# Patient Record
Sex: FEMALE | Race: BLACK OR AFRICAN AMERICAN
[De-identification: names, ages, dates, MRNs, and addresses within clinical notes are randomized per-mention and may not be internally consistent; named-entity substitution may affect disease eponyms.]

---

## 2018-08-25 ENCOUNTER — HOSPITAL ENCOUNTER (EMERGENCY)
Dept: HOSPITAL 74 - JER | Age: 38
Discharge: HOME | End: 2018-08-25
Payer: COMMERCIAL

## 2018-08-25 VITALS — HEART RATE: 81 BPM | SYSTOLIC BLOOD PRESSURE: 139 MMHG | DIASTOLIC BLOOD PRESSURE: 97 MMHG

## 2018-08-25 VITALS — TEMPERATURE: 98.9 F

## 2018-08-25 VITALS — BODY MASS INDEX: 42.5 KG/M2

## 2018-08-25 DIAGNOSIS — I10: ICD-10-CM

## 2018-08-25 DIAGNOSIS — G43.909: ICD-10-CM

## 2018-08-25 DIAGNOSIS — E11.9: ICD-10-CM

## 2018-08-25 DIAGNOSIS — R51: Primary | ICD-10-CM

## 2018-08-25 DIAGNOSIS — Z86.69: ICD-10-CM

## 2018-08-25 DIAGNOSIS — M06.80: ICD-10-CM

## 2018-08-25 DIAGNOSIS — Z87.39: ICD-10-CM

## 2018-08-25 LAB
ALBUMIN SERPL-MCNC: 3.9 G/DL (ref 3.4–5)
ALP SERPL-CCNC: 67 U/L (ref 45–117)
ALT SERPL-CCNC: 32 U/L (ref 12–78)
ANION GAP SERPL CALC-SCNC: 7 MMOL/L (ref 8–16)
AST SERPL-CCNC: 20 U/L (ref 15–37)
BASOPHILS # BLD: 0.6 % (ref 0–2)
BILIRUB SERPL-MCNC: 0.1 MG/DL (ref 0.2–1)
BUN SERPL-MCNC: 9 MG/DL (ref 7–18)
CALCIUM SERPL-MCNC: 8.8 MG/DL (ref 8.5–10.1)
CHLORIDE SERPL-SCNC: 111 MMOL/L (ref 98–107)
CO2 SERPL-SCNC: 24 MMOL/L (ref 21–32)
CREAT SERPL-MCNC: 0.6 MG/DL (ref 0.55–1.02)
DEPRECATED RDW RBC AUTO: 16.9 % (ref 11.6–15.6)
EOSINOPHIL # BLD: 2.8 % (ref 0–4.5)
GLUCOSE SERPL-MCNC: 111 MG/DL (ref 74–106)
HCT VFR BLD CALC: 33.7 % (ref 32.4–45.2)
HGB BLD-MCNC: 11.3 GM/DL (ref 10.7–15.3)
INR BLD: 1.02 (ref 0.83–1.09)
LYMPHOCYTES # BLD: 44.3 % (ref 8–40)
MCH RBC QN AUTO: 27.7 PG (ref 25.7–33.7)
MCHC RBC AUTO-ENTMCNC: 33.6 G/DL (ref 32–36)
MCV RBC: 82.3 FL (ref 80–96)
MONOCYTES # BLD AUTO: 7.6 % (ref 3.8–10.2)
NEUTROPHILS # BLD: 44.7 % (ref 42.8–82.8)
PLATELET # BLD AUTO: 273 K/MM3 (ref 134–434)
PMV BLD: 8 FL (ref 7.5–11.1)
POTASSIUM SERPLBLD-SCNC: 3.9 MMOL/L (ref 3.5–5.1)
PROT SERPL-MCNC: 7.1 G/DL (ref 6.4–8.2)
PT PNL PPP: 11.5 SEC (ref 9.7–13)
RBC # BLD AUTO: 4.1 M/MM3 (ref 3.6–5.2)
SODIUM SERPL-SCNC: 142 MMOL/L (ref 136–145)
WBC # BLD AUTO: 5 K/MM3 (ref 4–10)

## 2018-08-25 NOTE — EKG
Test Reason : 

Blood Pressure : ***/*** mmHG

Vent. Rate : 092 BPM     Atrial Rate : 092 BPM

   P-R Int : 174 ms          QRS Dur : 084 ms

    QT Int : 394 ms       P-R-T Axes : 062 -09 047 degrees

   QTc Int : 487 ms

 

SINUS RHYTHM WITH PREMATURE ATRIAL COMPLEXES WITH ABERRANT CONDUCTION

SEPTAL INFARCT , AGE UNDETERMINED

ABNORMAL ECG

NO PREVIOUS ECGS AVAILABLE

Confirmed by ESTRADA HUBBARD MD ([id number]) on 8/25/2018 6:43:05 PM

 

Referred By:             Confirmed By:ESTRADA HUBBARD MD

## 2018-08-25 NOTE — PDOC
History of Present Illness





- General


History Source: Patient


Exam Limitations: No Limitations





- History of Present Illness


Initial Comments: 





08/25/18 03:37


The patient is a 38 year old female, with a significant past medical history of 

Lupus, DM, HTN, fibromyalgia, rheumatoid arthritis, migraines, and seizures, 

who presents to the emergency department with, 2 weeks of a headache and 

lightheadedness. As per patient, her headache is described as bandlike with 

associated lightheadedness. She took Advil, without relief. She claims that her 

current symptoms are different from her normal migraines. She reports multiple 

episodes of dizziness like the room is spinning when she gets up too fast 

over the past week. She reports intermittent chest pain she describes as a dull

, constant, 7/10 that radiates to her back with associated palpitations. She 

reports diffuse body aches worsening in her hands and legs which is chronic in 

nature. The patient is currently on her menses. She claims to be compliant with 

all of her medications.





She denies recent fevers or chills. She denies recent nausea, vomit, diarrhea 

or constipation. She denies recent  dysuria, frequency, urgency or hematuria. 

She denies recent shortness of breath.





Allergies: Iodinated Contrast- Oral and IV Dye.


Social history: Nonsmoker. Denies EtOH use and recreational drug use. 


Primary Care Physician: Dr. Reyes











<Cristina Kraus - Last Filed: 08/25/18 03:46>





<Trinidad Aceves - Last Filed: 08/25/18 03:53>





- General


Chief Complaint: Lightheaded


Stated Complaint: PAIN/LIGHTHEADED


Time Seen by Provider: 08/25/18 01:52





Past History





<Cristina Kraus - Last Filed: 08/25/18 03:46>





- Suicide/Smoking/Psychosocial Hx


Smoking History: Never smoked


Have you smoked in the past 12 months: No


Information on smoking cessation initiated: No


Hx Alcohol Use: No


Drug/Substance Use Hx: No





<Trinidad Aceves - Last Filed: 08/25/18 03:53>





- Past Medical History


Allergies/Adverse Reactions: 


 Allergies











Allergy/AdvReac Type Severity Reaction Status Date / Time


 


Iodinated Contrast- Oral and Allergy   Verified 08/25/18 01:31





IV Dye     














**Review of Systems





- Review of Systems


Able to Perform ROS?: Yes


Comments:: 





08/25/18 03:37


GENERAL/CONSTITUTIONAL: No fever or chills. No weakness.


HEAD, EYES, EARS, NOSE AND THROAT: No change in vision. No ear pain or 

discharge. No sore throat.


+CARDIOVASCULAR: Chest pain. No shortness of breath.


RESPIRATORY: No cough, wheezing, or hemoptysis.


GASTROINTESTINAL: No nausea, vomiting, diarrhea or constipation.


GENITOURINARY: No dysuria, frequency, or change in urination.


+MUSCULOSKELETAL: Diffuse body aches. No neck or back pain.


SKIN: No rash


+NEUROLOGIC: Headache. Lightheadedness. No loss of consciousness.


ENDOCRINE: No increased thirst. No abnormal weight change.


HEMATOLOGIC/LYMPHATIC: No anemia, easy bleeding, or history of blood clots.


ALLERGIC/IMMUNOLOGIC: No hives or skin allergy.





All Other Systems: Reviewed and Negative





<Cristina Kraus - Last Filed: 08/25/18 03:46>





*Physical Exam





- Vital Signs


 Last Vital Signs











Temp Pulse Resp BP Pulse Ox


 


 98.9 F   92 H  16   155/93   98 


 


 08/25/18 00:30  08/25/18 00:30  08/25/18 00:30  08/25/18 00:30  08/25/18 00:30














- Physical Exam


Comments: 





08/25/18 03:46


GENERAL: Awake, alert, and fully oriented, in no acute distress


HEAD: No signs of trauma


EYES: PERRLA, EOMI, sclera anicteric, conjunctiva clear


ENT: Auricles normal inspection, hearing grossly normal, nares patent, 

oropharynx clear without exudates. Moist mucosa


NECK: Normal ROM, supple, no lymphadenopathy, JVD, or masses


LUNGS: Breath sounds equal, clear to auscultation bilaterally.  No wheezes, and 

no crackles


HEART: Regular rate and rhythm, normal S1 and S2, no murmurs, rubs or gallops


ABDOMEN: Soft, nontender, normoactive bowel sounds.  No guarding, no rebound.  

No masses


EXTREMITIES: Normal range of motion, no edema.  No clubbing or cyanosis. No 

cords, erythema, or tenderness


+NEUROLOGICAL: 4+/5 strength of the upper and lower right extremities. Normal 

strength of the upper and lower left extremities. Cranial nerves II through XII 

grossly intact.  Normal speech, normal gait


SKIN: Warm, Dry, normal turgor, no rashes or lesions noted.











<Cristina Kraus - Last Filed: 08/25/18 03:46>





- Vital Signs


 Last Vital Signs











Temp Pulse Resp BP Pulse Ox


 


 98.9 F   92 H  16   155/93   98 


 


 08/25/18 00:30  08/25/18 00:30  08/25/18 00:30  08/25/18 00:30  08/25/18 00:30














<Trinidad Aceves - Last Filed: 08/25/18 03:53>





ED Treatment Course





- LABORATORY


CBC & Chemistry Diagram: 


 08/25/18 02:58





 08/25/18 02:58





- ADDITIONAL ORDERS


Additional order review: 


 Laboratory  Results











  08/25/18 08/25/18 08/25/18





  02:58 02:58 02:58


 


PT with INR   


 


INR   


 


PTT (Actin FS)   


 


Sodium    142


 


Potassium    3.9


 


Chloride    111 H


 


Carbon Dioxide    24


 


Anion Gap    7 L


 


BUN    9


 


Creatinine    0.6


 


Creat Clearance w eGFR    > 60


 


Random Glucose    111 H


 


Calcium    8.8


 


Total Bilirubin    0.1 L


 


AST    20


 


ALT    32


 


Alkaline Phosphatase    67


 


Creatine Kinase    174


 


Troponin I    < 0.02


 


Total Protein    7.1


 


Albumin    3.9


 


Serum Pregnancy, Qual  Negative  


 


Carbamazepine   2.6 L* 














  08/25/18 08/25/18





  02:58 02:58


 


PT with INR  11.50 


 


INR  1.02 


 


PTT (Actin FS)   27.0


 


Sodium  


 


Potassium  


 


Chloride  


 


Carbon Dioxide  


 


Anion Gap  


 


BUN  


 


Creatinine  


 


Creat Clearance w eGFR  


 


Random Glucose  


 


Calcium  


 


Total Bilirubin  


 


AST  


 


ALT  


 


Alkaline Phosphatase  


 


Creatine Kinase  


 


Troponin I  


 


Total Protein  


 


Albumin  


 


Serum Pregnancy, Qual  


 


Carbamazepine  








 











  08/25/18





  02:58


 


RBC  4.10


 


MCV  82.3


 


MCHC  33.6


 


RDW  16.9 H


 


MPV  8.0


 


Neutrophils %  44.7


 


Lymphocytes %  44.3 H


 


Monocytes %  7.6


 


Eosinophils %  2.8


 


Basophils %  0.6














- Medications


Given in the ED: 


ED Medications














Discontinued Medications














Generic Name Dose Route Start Last Admin





  Trade Name Freq  PRN Reason Stop Dose Admin


 


Meclizine HCl  25 mg  08/25/18 02:50  08/25/18 03:10





  Antivert -  PO  08/25/18 02:51  25 mg





  ONCE ONE   Administration





     





     





     





     


 


Oxycodone/Acetaminophen  2 combo  08/25/18 02:50  08/25/18 03:10





  Percocet 5/325 -  PO  08/25/18 02:51  2 combo





  ONCE ONE   Administration





     





     





     





     














<Cristina Kraus - Last Filed: 08/25/18 03:46>





- LABORATORY


CBC & Chemistry Diagram: 


 08/25/18 02:58





 08/25/18 02:58





<Trinidad Aceves - Last Filed: 08/25/18 03:53>





Medical Decision Making





- Medical Decision Making





08/25/18 03:46


Pt's tegretol level is low.  Other labs are normal; trop normal; CPK normal.


WBC normal.


Chemistries normal.


C-reactive protein pending.


08/25/18 03:47


Pt's exam is normal, other than her chronic right sided weakness; pt has slight 

weakness on her right side copared to her left side.


No rashes, or fevers.


Chronic joint pains.


Pt has no flank or abd pain.





<Trinidad Aceves - Last Filed: 08/25/18 03:53>





*DC/Admit/Observation/Transfer





- Attestations


Scribe Attestion: 





08/25/18 03:37





Documentation prepared by Cristina Kraus, acting as medical scribe for 

Trinidad Aceves MD.





<Cristina Kraus - Last Filed: 08/25/18 03:46>





<Trinidad Aceves - Last Filed: 08/25/18 03:53>


Diagnosis at time of Disposition: 


 Headache, Uncontrolled hypertension, Seizure secondary to subtherapeutic 

anticonvulsant medication








- Discharge Dispostion


Disposition: HOME


Condition at time of disposition: Stable





- Referrals


Referrals: 


Wan Reyes [Primary Care Provider] - 





- Patient Instructions


Printed Discharge Instructions:  Essential Hypertension, Lifestyle Habits May 

Lower Risk of Hypertension in Women, DI for Headache





- Post Discharge Activity

## 2020-10-12 ENCOUNTER — HOSPITAL ENCOUNTER (INPATIENT)
Dept: HOSPITAL 74 - JER | Age: 40
LOS: 7 days | Discharge: HOME | DRG: 547 | End: 2020-10-19
Attending: INTERNAL MEDICINE | Admitting: GENERAL ACUTE CARE HOSPITAL
Payer: COMMERCIAL

## 2020-10-12 VITALS — BODY MASS INDEX: 39.3 KG/M2

## 2020-10-12 DIAGNOSIS — E78.1: ICD-10-CM

## 2020-10-12 DIAGNOSIS — M79.7: ICD-10-CM

## 2020-10-12 DIAGNOSIS — Z86.718: ICD-10-CM

## 2020-10-12 DIAGNOSIS — R07.81: ICD-10-CM

## 2020-10-12 DIAGNOSIS — R06.02: ICD-10-CM

## 2020-10-12 DIAGNOSIS — J45.909: ICD-10-CM

## 2020-10-12 DIAGNOSIS — I10: ICD-10-CM

## 2020-10-12 DIAGNOSIS — R07.89: ICD-10-CM

## 2020-10-12 DIAGNOSIS — M32.9: Primary | ICD-10-CM

## 2020-10-12 DIAGNOSIS — G40.909: ICD-10-CM

## 2020-10-12 DIAGNOSIS — E05.90: ICD-10-CM

## 2020-10-12 DIAGNOSIS — G43.909: ICD-10-CM

## 2020-10-12 DIAGNOSIS — F32.9: ICD-10-CM

## 2020-10-12 DIAGNOSIS — E66.01: ICD-10-CM

## 2020-10-12 LAB
ALBUMIN SERPL-MCNC: 4.1 G/DL (ref 3.4–5)
ALP SERPL-CCNC: 82 U/L (ref 45–117)
ALT SERPL-CCNC: 50 U/L (ref 13–61)
AMPHET UR-MCNC: NEGATIVE NG/ML
ANION GAP SERPL CALC-SCNC: 4 MMOL/L (ref 8–16)
APPEARANCE UR: CLEAR
AST SERPL-CCNC: 22 U/L (ref 15–37)
BACTERIA # UR AUTO: 103 /UL (ref 0–1359)
BARBITURATES UR-MCNC: NEGATIVE NG/ML
BASOPHILS # BLD: 0.5 % (ref 0–2)
BENZODIAZ UR SCN-MCNC: NEGATIVE NG/ML
BILIRUB SERPL-MCNC: 0.2 MG/DL (ref 0.2–1)
BILIRUB UR STRIP.AUTO-MCNC: NEGATIVE MG/DL
BUN SERPL-MCNC: 9.4 MG/DL (ref 7–18)
CALCIUM SERPL-MCNC: 8.8 MG/DL (ref 8.5–10.1)
CASTS URNS QL MICRO: 1 /UL (ref 0–3.1)
CHLORIDE SERPL-SCNC: 106 MMOL/L (ref 98–107)
CO2 SERPL-SCNC: 28 MMOL/L (ref 21–32)
COCAINE UR-MCNC: NEGATIVE NG/ML
COLOR UR: (no result)
CREAT SERPL-MCNC: 0.6 MG/DL (ref 0.55–1.3)
DEPRECATED RDW RBC AUTO: 13.3 % (ref 11.6–15.6)
EOSINOPHIL # BLD: 2.6 % (ref 0–4.5)
EPITH CASTS URNS QL MICRO: 30 /UL (ref 0–25.1)
GLUCOSE SERPL-MCNC: 103 MG/DL (ref 74–106)
HCT VFR BLD CALC: 38.5 % (ref 32.4–45.2)
HGB BLD-MCNC: 13.4 GM/DL (ref 10.7–15.3)
INR BLD: 1.1 (ref 0.83–1.09)
KETONES UR QL STRIP: NEGATIVE
LEUKOCYTE ESTERASE UR QL STRIP.AUTO: NEGATIVE
LYMPHOCYTES # BLD: 36.9 % (ref 8–40)
MCH RBC QN AUTO: 32.2 PG (ref 25.7–33.7)
MCHC RBC AUTO-ENTMCNC: 34.7 G/DL (ref 32–36)
MCV RBC: 92.6 FL (ref 80–96)
METHADONE UR-MCNC: NEGATIVE NG/ML
MONOCYTES # BLD AUTO: 5.9 % (ref 3.8–10.2)
NEUTROPHILS # BLD: 54.1 % (ref 42.8–82.8)
NITRITE UR QL STRIP: NEGATIVE
OPIATES UR QL SCN: NEGATIVE NG/ML
PCP UR QL SCN: NEGATIVE NG/ML
PH UR: >= 9 [PH] (ref 5–8)
PLATELET # BLD AUTO: 284 K/MM3 (ref 134–434)
PMV BLD: 8 FL (ref 7.5–11.1)
POTASSIUM SERPLBLD-SCNC: 3.9 MMOL/L (ref 3.5–5.1)
PROT SERPL-MCNC: 7.7 G/DL (ref 6.4–8.2)
PROT UR QL STRIP: (no result)
PROT UR QL STRIP: NEGATIVE
PT PNL PPP: 12.9 SEC (ref 9.7–13)
RBC # BLD AUTO: 4.16 M/MM3 (ref 3.6–5.2)
RBC # BLD AUTO: 42 /UL (ref 0–23.9)
SODIUM SERPL-SCNC: 138 MMOL/L (ref 136–145)
SP GR UR: 1.02 (ref 1.01–1.03)
UROBILINOGEN UR STRIP-MCNC: 0.2 MG/DL (ref 0.2–1)
WBC # BLD AUTO: 6.6 K/MM3 (ref 4–10)
WBC # UR AUTO: 69.6 /UL (ref 0–25.8)

## 2020-10-12 PROCEDURE — C8910 MRA W/O CONT, CHEST: HCPCS

## 2020-10-12 PROCEDURE — C9803 HOPD COVID-19 SPEC COLLECT: HCPCS

## 2020-10-12 PROCEDURE — A9540 TC99M MAA: HCPCS

## 2020-10-12 PROCEDURE — A9539 TC99M PENTETATE: HCPCS

## 2020-10-12 PROCEDURE — U0003 INFECTIOUS AGENT DETECTION BY NUCLEIC ACID (DNA OR RNA); SEVERE ACUTE RESPIRATORY SYNDROME CORONAVIRUS 2 (SARS-COV-2) (CORONAVIRUS DISEASE [COVID-19]), AMPLIFIED PROBE TECHNIQUE, MAKING USE OF HIGH THROUGHPUT TECHNOLOGIES AS DESCRIBED BY CMS-2020-01-R: HCPCS

## 2020-10-12 RX ADMIN — ENOXAPARIN SODIUM SCH MG: 100 INJECTION SUBCUTANEOUS at 21:03

## 2020-10-12 NOTE — HP
CHIEF COMPLAINT: SOB








HISTORY OF PRESENT ILLNESS:





40 F h/o SLE, DVT 10 years ago (was on Coumadin), gastric bypass 2019, seizure 

disorder, asthma, HTN, obesity, fibromyalgia, presents with sudden onset SOB and

L sided CP starting 3-4 days ago. Patient endorses suddenly feeling short of 

breath 3 days ago, endorses increased WOB and CP when she coughs or takes a deep

breath. In ED pt. found to be tachycardic and tachypneic saturating 99% on RA. 

Patient endorses IV contrast allergy w/ "shortness of breath", V/Q scheduled but

not done during the day. Denies fever/chills/LOC/travel/sick contacts. No blood 

in stool/mucus/urine. 








ER course was notable for:


(1) EKG w/ TWI Lead III and T wave flattening aVL


(2) Trops neg. x1


(3) CT chest w/o cont. showing dilated pulm. artery, Therapeutic Lovenox 

empirically given for PE 





Recent Travel: Denies





PAST MEDICAL HISTORY: as above





PAST SURGICAL HISTORY: gastric bypass surgery 2019





Social History: denies x3





Allergies





Iodinated Contrast Media Allergy (Verified 10/12/20 15:00)


   


sumatriptan [From Imitrex] Allergy (Verified 10/12/20 15:00)


   Difficulty Breathing








HOME MEDICATIONS:


                                Home Medications











 Medication  Instructions  Recorded


 


Carbamazepine [Tegretol -] 200 mg PO BID 08/25/18


 


Losartan Potassium [Cozaar] 25 mg PO DAILY 08/25/18














PHYSICAL EXAMINATION


                               Vital Signs - 24 hr











  10/12/20 10/12/20 10/12/20





  15:01 15:34 17:00


 


Temperature 98.2 F  


 


Pulse Rate 106 H 88 


 


Pulse Rate [   87





Apical]   


 


Respiratory 22 H  17





Rate   


 


Blood Pressure 116/72  


 


Blood Pressure   107/76





[Right Arm]   


 


O2 Sat by Pulse 100 99 100





Oximetry (%)   














  10/12/20





  17:21


 


Temperature 


 


Pulse Rate 


 


Pulse Rate [ 





Apical] 


 


Respiratory 17





Rate 


 


Blood Pressure 


 


Blood Pressure 





[Right Arm] 


 


O2 Sat by Pulse 100





Oximetry (%) 











GA slightly tachypneic, anxious, answering questions


HEENT NC/AT, no JVD, no stridor, wide neck circumference


CHest no wheezing or crackles, increased WOB, slightly tachypneic


CVS S1, S2+, sinus tachycardia, no m/r/g


Abd obese, Soft, NT, BS+


Ext no LE edema, no calf tenderness





                         Laboratory Results - last 24 hr











  10/12/20 10/12/20 10/12/20





  15:20 15:20 15:20


 


WBC  6.6  


 


RBC  4.16  


 


Hgb  13.4  


 


Hct  38.5  


 


MCV  92.6  


 


MCH  32.2  D  


 


MCHC  34.7  


 


RDW  13.3  D  


 


Plt Count  284  


 


MPV  8.0  


 


Absolute Neuts (auto)  3.6  


 


Neutrophils %  54.1  D  


 


Lymphocytes %  36.9  


 


Monocytes %  5.9  


 


Eosinophils %  2.6  


 


Basophils %  0.5  


 


Nucleated RBC %  0  


 


PT with INR   


 


INR   


 


PTT (Actin FS)   


 


D-Dimer   


 


Sodium    138


 


Potassium    3.9


 


Chloride    106


 


Carbon Dioxide    28


 


Anion Gap    4 L


 


BUN    9.4


 


Creatinine    0.6


 


Est GFR (CKD-EPI)AfAm    134.01


 


Est GFR (CKD-EPI)NonAf    115.63


 


Random Glucose    103


 


Calcium    8.8


 


Total Bilirubin    0.2


 


AST    22


 


ALT    50


 


Alkaline Phosphatase    82


 


Creatine Kinase    88


 


Troponin I    < 0.02


 


Total Protein    7.7


 


Albumin    4.1


 


Urine Color   Dk yellow 


 


Urine Appearance   Clear 


 


Urine pH   >= 9.0 H 


 


Ur Specific Gravity   1.024 


 


Urine Protein   1+ H 


 


Urine Glucose (UA)   Negative 


 


Urine Ketones   Negative 


 


Urine Blood   Negative 


 


Urine Nitrite   Negative 


 


Urine Bilirubin   Negative 


 


Urine Urobilinogen   0.2 


 


Ur Leukocyte Esterase   Negative 


 


Urine WBC (Auto)   69.6 


 


Urine RBC (Auto)   42 


 


Urine Casts (Auto)   1 


 


U Epithel Cells (Auto)   30 


 


Urine Bacteria (Auto)   103 


 


Urine HCG, Qual   Negative 














  10/12/20 10/12/20 10/12/20





  18:30 18:30 19:50


 


WBC   


 


RBC   


 


Hgb   


 


Hct   


 


MCV   


 


MCH   


 


MCHC   


 


RDW   


 


Plt Count   


 


MPV   


 


Absolute Neuts (auto)   


 


Neutrophils %   


 


Lymphocytes %   


 


Monocytes %   


 


Eosinophils %   


 


Basophils %   


 


Nucleated RBC %   


 


PT with INR  12.90  


 


INR  1.10 H  


 


PTT (Actin FS)   30.6 


 


D-Dimer    474


 


Sodium   


 


Potassium   


 


Chloride   


 


Carbon Dioxide   


 


Anion Gap   


 


BUN   


 


Creatinine   


 


Est GFR (CKD-EPI)AfAm   


 


Est GFR (CKD-EPI)NonAf   


 


Random Glucose   


 


Calcium   


 


Total Bilirubin   


 


AST   


 


ALT   


 


Alkaline Phosphatase   


 


Creatine Kinase   


 


Troponin I   


 


Total Protein   


 


Albumin   


 


Urine Color   


 


Urine Appearance   


 


Urine pH   


 


Ur Specific Gravity   


 


Urine Protein   


 


Urine Glucose (UA)   


 


Urine Ketones   


 


Urine Blood   


 


Urine Nitrite   


 


Urine Bilirubin   


 


Urine Urobilinogen   


 


Ur Leukocyte Esterase   


 


Urine WBC (Auto)   


 


Urine RBC (Auto)   


 


Urine Casts (Auto)   


 


U Epithel Cells (Auto)   


 


Urine Bacteria (Auto)   


 


Urine HCG, Qual   





                                Home Medications











 Medication  Instructions  Recorded


 


Carbamazepine [Tegretol -] 200 mg PO BID 08/25/18


 


Losartan Potassium [Cozaar] 25 mg PO DAILY 08/25/18








                               Current Medications











Generic Name Dose Route Start Last Admin





  Trade Name Bharathq  PRN Reason Stop Dose Admin


 


Carbamazepine  200 mg  10/12/20 22:00  10/12/20 22:13





  Tegretol -  PO   200 mg





  BID ADRIANNA   Administration


 


Enoxaparin Sodium  100 mg  10/12/20 20:45  10/12/20 21:03





  Lovenox -  SQ   100 mg





  BID ADRIANNA   Administration














ASSESSMENT/PLAN:





40 F





Highly suspected PE


h/o DVT prev. on Coumadin


HTN


s/p gastric bypass


SLE


Seizure disorder


Fibromyalgia


Depression





Plan:


CT chest w/o cont. neg. for aortic aneurysm/hematoma, dilated PA (?Pulm HTN, PE)


WELLS SCORE 3 which is moderate risk for PE 


Given h/o DVT in the past, Wells score, and h/o SLE (hypercoaguable state) and 

no absolute contraindication to AC, will give Therapeutic Lovenox pending V/Q 

scan.


Pulmonary consult 


Restart seizure meds


Echo


Utox, Thyroid panel (thyromegaly on CT)


Tele monitoring


DVT ppx: Lovenox 














Family Medical History


Family History: As Documented





Visit type





- Emergency Visit


Emergency Visit: Yes


ED Registration Date: 10/12/20


Care time: The patient presented to the Emergency Department on the above date 

and was hospitalized for further evaluation of their emergent condition.





- New Patient


This patient is new to me today: Yes


Date on this admission: 10/12/20





- Critical Care


Critical Care patient: No

## 2020-10-12 NOTE — EKG
Test Reason : 

Blood Pressure : ***/*** mmHG

Vent. Rate : 088 BPM     Atrial Rate : 088 BPM

   P-R Int : 188 ms          QRS Dur : 098 ms

    QT Int : 376 ms       P-R-T Axes : 004 069 000 degrees

   QTc Int : 454 ms

 

NORMAL SINUS RHYTHM

LOW VOLTAGE QRS

SEPTAL INFARCT (CITED ON OR BEFORE 25-AUG-2018)

ABNORMAL ECG

WHEN COMPARED WITH ECG OF 25-AUG-2018 03:06,

NO SIGNIFICANT CHANGE WAS FOUND

Confirmed by CHARMAINE BELTRE, BERKLEY ([id number]) on 10/12/2020 8:17:04 PM

 

Referred By:             Confirmed By:BERKLEY MONTANO MD

## 2020-10-12 NOTE — XMS
Summarization Of Episode

                           Created on:2020



Patient:GIOVANA RANGEL

Sex:Female

:1980

External Reference #:[id number]





Demographics







                          Address                   [address] [place] [address]

 

                          Home Phone                [phone]

 

                          Preferred Language        English

 

                          Marital Status            Not  or 

 

                          Protestant Affiliation     CH

 

                          Race                      BL

 

                          Ethnic Group              Not  or 









Author







                          Organization              [facility]









Support







                Name            Relationship    Address         Phone

 

                UE, UNEMPLOYED  Unavailable     Unavailable     Unavailable

 

                UE              Unavailable     Unavailable     Unavailable

 

                JONO COBIAN MOTHER          [id number] [phone]



                                                [place] [address] 

 

                UNEMPLOYD       Unavailable     Unavailable     Unavailable









Re-disclosure Warning

The records that you are about to access may contain information from federally-
assisted alcohol or drug abuse programs. If such information is present, then 
the following federally mandated warning applies: This information has been 
disclosed to you from records protected by federal confidentiality rules (42 CFR
part 2). The federal rules prohibit you from making any further disclosure of 
this information unless further disclosure is expressly permitted by the written
consent of the person to whom it pertains or as otherwise permitted by 42 CFR 
part 2. A general authorization for the release of medical or other information 
is NOT sufficient for this purpose. The Federal rules restrict any use of the 
information to criminally investigate or prosecute any alcohol or drug abuse 
patient.The records that you are about to access may contain highly sensitive 
health information, the redisclosure of which is protected by Article 27-F of 
the [facility] Public Health law. If you continue you may haveaccess to 
information: Regarding HIV / AIDS; Provided by facilities licensed or operated 
by the [facility] Office of Mental Health; or Provided by the [facility]
Office for People With Developmental Disabilities. If such information is 
present, then the following [place] mandated warning applies: This 
information has been disclosed to you from confidential records which are 
protected by state law. State law prohibits you from making any further 
disclosure of this information without the specific written consent of the 
person to whom it pertains, or as otherwise permitted by law. Any unauthorized 
further disclosure in violation of state law may result in a fine or detention 
sentence or both. A general authorization for the release of medical or other 
information is NOT sufficient authorization for further disclosure.



Insurance Providers







          Payer name Policy type Policy ID Covered   Covered party's Policy    P

sujatha



                    / Coverage           party ID  relationship to Zarate    Inf

ormation



                    type                          zarate              

 

          [facility]           [id number]           [id number]                  [id number]

[id number]



          NON CAP                                                     

 

          [employer]           [id number]           SP                  [id number]

3

 

          [facility]           [id number]           SP                  [id number]

[id number]



          NON CAP                                                     

 

          [employer]           [id number]            PT                  [id number]



          INS

## 2020-10-12 NOTE — XMS
Summarization Of Episode

                           Created on:2020



Patient:GIOVANA RANGEL

Sex:Female

:1980

External Reference #:[id number]





Demographics







                          Address                   [address] [place] [address]

 

                          Home Phone                [phone]

 

                          Preferred Language        English

 

                          Marital Status            Not  or 

 

                          Anglican Affiliation     CH

 

                          Race                      BL

 

                          Ethnic Group              Not  or 









Author







                          Organization              [facility]









Support







                Name            Relationship    Address         Phone

 

                UE              Unavailable     Unavailable     Unavailable

 

                JONO COBIAN          [id number]     [phone]



                                                [place] [address] 

 

                UNEMPLOYD       Unavailable     Unavailable     Unavailable









Re-disclosure Warning




Insurance Providers







          Payer name Policy type Policy ID Covered   Covered party's Policy    P

sujatha



                    / Coverage           party ID  relationship to Zarate    Inf

ormation



                    type                          zarate              

 

          [facility]           [id number]           SP                  [id number]

[id number]



          NON CAP                                                     

 

          [employer]           [id number]           SP                  [id number]

3

 

          [employer]           [id number]            PT                  [id number]



          INS

## 2020-10-12 NOTE — PDOC
Rapid Medical Evaluation


Time Seen by Provider: 10/12/20 15:03


Medical Evaluation: 


                                    Allergies











Allergy/AdvReac Type Severity Reaction Status Date / Time


 


Iodinated Contrast Media Allergy   Verified 10/12/20 15:00


 


sumatriptan [From Imitrex] Allergy  Difficulty Verified 10/12/20 15:00





   Breathing  











10/12/20 15:03


40 year old female hx of lupus and gastric bypass complaining of chest pain and 

SOB.  Better with sitting up 





Vitals significant for  O2 100





PE: CTA 


RRR





Plan:


Labs 


EKG 


Chest XR





Pt to precede to ED for further eval

## 2020-10-12 NOTE — PDOC
Attending Attestation





- Resident


Resident Name: SenaitRadha





- ED Attending Attestation


I have performed the following: I have examined & evaluated the patient, The 

case was reviewed & discussed with the resident, I agree w/resident's findings &

plan, Exceptions are as noted





- HPI


HPI: 





10/12/20 17:15


This 39 yo female  with PMH  of Lupus,DVT,obesity, has had pleuretic chest pain 

that  increases with deep breaths 





- Physicial Exam


PE: 





10/12/20 17:25


obese 39 yo female p/e chest pain


head ncat


neck supple


lungs  cta b/l


cvs  [id number] 


abdomen  no rebound


extremities no deformities 


skin warm and dry


neuro axox3,ambulatory


psych  appropriate





- Medical Decision Making





10/12/20 17:27


plan   labs/anticoagulation/duplex dopplers LE  and admit for VQ. IR was 

consulted to and is aware of this pt. PT is admitted 





-complication to obtain cta chest is the patient's allergy to    IV constrast


10/12/20 20:08








Discharge





- Discharge Information


Problems reviewed: Yes


Clinical Impression/Diagnosis: 


 Shortness of breath





Chest pain


Qualifiers:


 Chest pain type: chest pain on breathing Qualified Code(s): R07.1 - Chest pain 

on breathing








- Follow up/Referral





- Patient Discharge Instructions





- Post Discharge Activity

## 2020-10-12 NOTE — PDOC
History of Present Illness





- General


Chief Complaint: Chest Pain


Stated Complaint: CHEST PAIN


Time Seen by Provider: 10/12/20 15:03





- History of Present Illness


Initial Comments: 





10/12/20 15:57


HPI:


This is a 41 y/o female with a PMH of lupus, HTN, RA, seizures, DVT 10 years ago

presenting to the ED because of 4 days of chest pain. The pain started abruptly 

and she denies any inciting incident. Its constant, sharp, left sided, and 

occasionally radiates to her back. The pain is worse with deep breaths. She is 

also having increased SOB, worse with exertion and is also complaining of 

lightheadedness. She denies any LOC. She has experienced similar pain before, 

but never this bad. She denies any associated N/V, abdominal pain, headache, 

blurry vision, or pain in her calfs. Admits to DVT over 10 years ago, and she 

was anticoagulated with Coumadin. No longer on anticoagulants. Patient reports 

IV contrast allergy. It makes her SOB. 





She denies recent travel, sick contacts, hx of malignancy, cough, or hemoptysis.







ROS:


GENERAL/CONSTITUTIONAL: No fever/chills, diaphoresis, or weakness.


HEENT: No change in vision. No ear pain.  No sore throat.


CARDIOVASCULAR: Admits to sharp left sided chest pain, worse with deep breaths. 

Denies palpitations or peripheral edema. 


RESPIRATORY: Admits to shortness of breath, worse with exertion. No cough, 

wheezing, or hemoptysis.


GASTROINTESTINAL: No abdominal pain, nausea, vomiting, diarrhea or constipation.


GENITOURINARY: No dysuria, frequency


MUSCULOSKELETAL: No joint or muscle swelling or pain. 


SKIN: No rash or hives


NEUROLOGIC: Admits to lightheadedness. Denies vertigo, focal weakness, loss of 

consciousness, or change in strength/sensation.


ENDOCRINE: No increased thirst. No unexplained weight loss.


HEMATOLOGIC/LYMPHATIC: History of DVT 10 years ago. Not on anticoagulation.





PCP: [facility]





PMH: lupus, HTN, RA, seizures, DVT 10 years ago


PSx: Bariatric surgery


Social Hx: Denied etoh, tobacco, drug use


Meds: See nurse note


Allergies: IV contrast





PE:


GENERAL: Awake, alert, and fully oriented, in no acute distress. Patient is non-

toxic, laying in bed able to speak full sentences. Shallow breathing.


HEENT: Normocephalic, atraumatic. PERRLA, EOMI. No conjunctival pallor. Moist 

mucous membranes.


NECK: Normal ROM and supple. No lymphadenopathy, JVD, or masses.


CARDIOVASCULAR: Tachycardic, normal S1 and S2, no murmurs, rubs or gallops


PULMONARY: Tachypneic taking shallow breaths. Breath sounds equal, clear to au

scultation bilaterally.  No wheezes, rales or rhonchi.


ABDOMEN: Soft, nontender, normoactive bowel sounds.


EXTREMITIES: Normal range of motion, no edema or erythema, no calf tenderness.  

No clubbing or cyanosis. 


NEUROLOGICAL: Cranial nerves II through XII grossly intact.  Normal speech.


SKIN: Warm, Dry, normal turgor, no rashes or lesions noted. Normal capillary 

refill.





MDM:


10/12/20 16:46


This is a 41 y/o female with a PMH of lupus, HTN, RA, seizures, DVT 10 years ago

presenting to the ED because of 4 days of chest pain.


- Pain is sharp, constant, pleuritic


- Hemodynamically stable. Saturating 100% on RA. Taking shallow breaths.


- Hx of DVT, lupus. Concern for PE


- Afebrile, no cough. Less concerned for pna


- Patient reports allergy to contrast and says it makes her short of breath





CBC, CMP, Cardiac Panel, Coags


CXR, EKG


V/Q scan because of contrast allergy





EKG:


No ST elevations


T wave inversion in III not on previous EKG from Aug 2018


Sinus rhythm 88bpm


Low voltage QRS


Normal intervals





- Spoke with Dr. Chavez - As long as patient is  hemodynamically stable can be 

admitted for V/Q scan in the morning. DVT study tonight, CXR, and can dose 

heparin if high suspicion for PE.





Wells score of 3, putting her at 16.2% chance of PE in the ED population





10/12/20 16:51


- Pending CXR and coags will admit for V/Q scan tomorrow. 


- Tylenol for pain, fluids





10/12/20 17:54


- Patient currently getting DVT U/S





Labs: CBC WNL


CMP WNL


Troponin negative


UA negative





Decision to anticoagulate her due to 16.2% change of PE and hypercoagulable 

state. No contraindications to anticoagulation.





10/12/20 17:54


- Admitted to [facility] 


10/13/20 09:02





10/13/20 09:03








Past History





- Medical History


Allergies/Adverse Reactions: 


                                    Allergies











Allergy/AdvReac Type Severity Reaction Status Date / Time


 


Iodinated Contrast Media Allergy   Verified 10/12/20 15:00


 


sumatriptan [From Imitrex] Allergy  Difficulty Verified 10/12/20 15:00





   Breathing  











Home Medications: 


Ambulatory Orders





Carbamazepine [Tegretol -] 200 mg PO BID 08/25/18 


Losartan Potassium [Cozaar] 25 mg PO DAILY 08/25/18 








COPD: No


Diabetes: Yes


HTN: Yes


Seizures: Yes


Other medical history: Lupus





- Surgical History


GI Surgery: Yes (bypass)





- Reproductive History


Is Patient Pregnant Now?: No





- Psycho-Social/Smoking History


Smoking History: Never smoked


Have you smoked in the past 12 months: No





- Substance Abuse Hx (Audit-C & DAST Scrn)


How often the patient has a drink containing alcohol: Never


Score: In Men: 4 or > Positive; In Women: 3 or > Positive: 0


Screen Result (Pos requires Nsg. Audit-10AR): Negative


In the last yr the pt used illegal drug/Rx for NonMed reason: No


Score:  Yes response is considered Positive: 0


Screen Result (Positive result requires Nsg. DAST-10): Negative





*Physical Exam





- Vital Signs


                                Last Vital Signs











Temp Pulse Resp BP Pulse Ox


 


 98.2 F   106 H  22 H  116/72   100 


 


 10/12/20 15:01  10/12/20 15:01  10/12/20 15:01  10/12/20 15:01  10/12/20 15:01














**Heart Score/ECG Review





- History


History: Slightly suspicious





- Electrocardiogram


EKG: Non specific repolarization disturbance





- Age


Age: </= 45





- Risk Factors


Risk Factors Heart Score: Yes Hx Hypertension, Yes Hx Obesity


Based on the list above the patient has:: 1-2 risk factors





- Troponin


Troponin: </= normal limit





- Score


Heart Score - Total: 2





ED Treatment Course





- LABORATORY


CBC & Chemistry Diagram: 


                                 10/13/20 06:09





                                 10/13/20 06:09





Discharge





- Discharge Information


Problems reviewed: Yes


Clinical Impression/Diagnosis: 


 Shortness of breath





Chest pain


Qualifiers:


 Chest pain type: chest pain on breathing Qualified Code(s): R07.1 - Chest pain 

on breathing








- Admission


Yes





- Follow up/Referral





- Patient Discharge Instructions





- Post Discharge Activity

## 2020-10-13 LAB
ALBUMIN SERPL-MCNC: 3.3 G/DL (ref 3.4–5)
ALP SERPL-CCNC: 67 U/L (ref 45–117)
ALT SERPL-CCNC: 38 U/L (ref 13–61)
ANION GAP SERPL CALC-SCNC: 5 MMOL/L (ref 8–16)
APTT BLD: 35.7 SECONDS (ref 25.2–36.5)
AST SERPL-CCNC: 14 U/L (ref 15–37)
BASOPHILS # BLD: 0.3 % (ref 0–2)
BILIRUB SERPL-MCNC: 0.2 MG/DL (ref 0.2–1)
BUN SERPL-MCNC: 8.2 MG/DL (ref 7–18)
CALCIUM SERPL-MCNC: 8.1 MG/DL (ref 8.5–10.1)
CHLORIDE SERPL-SCNC: 110 MMOL/L (ref 98–107)
CHOLEST SERPL-MCNC: 169 MG/DL (ref 50–200)
CO2 SERPL-SCNC: 24 MMOL/L (ref 21–32)
CREAT SERPL-MCNC: 0.5 MG/DL (ref 0.55–1.3)
DEPRECATED RDW RBC AUTO: 13.6 % (ref 11.6–15.6)
EOSINOPHIL # BLD: 3.4 % (ref 0–4.5)
GLUCOSE SERPL-MCNC: 89 MG/DL (ref 74–106)
HCT VFR BLD CALC: 33.9 % (ref 32.4–45.2)
HDLC SERPL-MCNC: 68 MG/DL (ref 40–60)
HGB BLD-MCNC: 12 GM/DL (ref 10.7–15.3)
INR BLD: 1.1 (ref 0.83–1.09)
LDLC SERPL CALC-MCNC: 81 MG/DL (ref 5–100)
LYMPHOCYTES # BLD: 46.8 % (ref 8–40)
MCH RBC QN AUTO: 32.8 PG (ref 25.7–33.7)
MCHC RBC AUTO-ENTMCNC: 35.4 G/DL (ref 32–36)
MCV RBC: 92.7 FL (ref 80–96)
MONOCYTES # BLD AUTO: 6.8 % (ref 3.8–10.2)
NEUTROPHILS # BLD: 42.7 % (ref 42.8–82.8)
PLATELET # BLD AUTO: 240 K/MM3 (ref 134–434)
PMV BLD: 8.2 FL (ref 7.5–11.1)
POTASSIUM SERPLBLD-SCNC: 3.6 MMOL/L (ref 3.5–5.1)
PROT SERPL-MCNC: 6.2 G/DL (ref 6.4–8.2)
PT PNL PPP: 12.9 SEC (ref 9.7–13)
RBC # BLD AUTO: 3.66 M/MM3 (ref 3.6–5.2)
SODIUM SERPL-SCNC: 140 MMOL/L (ref 136–145)
TRIGL SERPL-MCNC: 227 MG/DL (ref 0–150)
WBC # BLD AUTO: 5.5 K/MM3 (ref 4–10)

## 2020-10-13 RX ADMIN — ENOXAPARIN SODIUM SCH MG: 100 INJECTION SUBCUTANEOUS at 21:37

## 2020-10-13 RX ADMIN — ENOXAPARIN SODIUM SCH MG: 100 INJECTION SUBCUTANEOUS at 10:33

## 2020-10-13 NOTE — PN
Progress Note (short form)





- Note


Progress Note: 





PULMONARY








CONSULTATION DICTATED 10/13/20





IMP CP/DYSPNEA  DOUBT PE,?LUPUS PLEURITIS


      H/O SLE


      H/O DVT UNPROVOKED NEVER WORKED UP FOR HYPER-COAGULABLE STATE


      ASTHMA


      SEIZURE DISORDER


      HTN


      H/O GASTRIC BYPASS


      OBESITY





PLAN O2 AS NEEDED


       ANALGESICS


       INHALED BRONCHODILATORS AS NEEDED


       ESR,CRP


       ECHO


       DUPLEX LOWER EXT


       V/Q








DR RDZ


       


       


    


     





Problem List





- Problems


(1) HTN (hypertension)


Code(s): I10 - ESSENTIAL (PRIMARY) HYPERTENSION   





(2) Chest pain


Code(s): R07.9 - CHEST PAIN, UNSPECIFIED   


Qualifiers: 


   Chest pain type: chest pain on breathing   Qualified Code(s): R07.1 - Chest 

pain on breathing; R07.81 - Pleurodynia   





(3) Shortness of breath


Code(s): R06.02 - SHORTNESS OF BREATH   





(4) SLE (systemic lupus erythematosus)


Code(s): M32.9 - SYSTEMIC LUPUS ERYTHEMATOSUS, UNSPECIFIED   





(5) H/O deep venous thrombosis


Code(s): Z86.718 - PERSONAL HISTORY OF OTHER VENOUS THROMBOSIS AND EMBOLISM

## 2020-10-13 NOTE — CONS
PULMONARY CONSULTATION

 

DATE OF CONSULTATION:  10/13/2020

 

REFERRING PHYSICIAN:  Rocky Archer MD

 

Patient is a 40-year-old female with past medical history of SLE, maintained on

Plaquenil; history of DVT, unprovoked 10 years ago, was treated with Coumadin as well

as Lovenox for approximately 1 year; gastric bypass in 2019; seizure disorder;

asthma; hypertension; fibromyalgia; admitted to [facility] on

October 12, with complaint of sudden onset of shortness of breath and left-sided

pleuritic chest pain which started 3-4 days prior to admission.  Patient states she

always has left-sided chest discomfort.  For the past 3 days, she had acute onset of

worsening of chest pain associated with inspiration as well as shortness of breath. 

Denied any fevers, chills.  Denies any cough.  No hemoptysis.  She presented to the

emergency room.  In the ER, she was noted to be tachypneic and tachycardic with O2

saturations of 99% on room air.  CTA of the chest was not able to be performed

secondary to IV CONTRAST allergy.  Patient denies any recent travel.  There is no

history of occupational exposure to chemicals or fumes.  She is a nonsmoker.  She

denies any hemoptysis.  Denies any fevers, weight loss, or night sweats.  Of note,

she underwent a CAT scan of the chest without contrast which showed dilated pulmonary

artery.  She was started on empiric Lovenox for possible PE.

 

PAST MEDICAL HISTORY:  Again includes DVT, unprovoked, never worked up for

hypercoagulable state; SLE; gastric bypass; seizures; asthma; hypertension; obesity;

fibromyalgia.

 

REVIEW OF SYSTEMS:  Positive mild shortness of breath.  Positive pleuritic chest

pain.  No fever.  No chills.  No hemoptysis.  No cough.  No abdominal pain.  No lower

extremity edema.

 

CURRENT MEDICATIONS:  Include Tegretol and Lovenox.

 

PHYSICAL EXAMINATION:

General:  Patient is an obese female, awake, alert, uncomfortable, in no acute

distress.

Vital Signs:  She is afebrile.  Blood pressure 105/67.  Respiratory rate is 20.  O2

saturation is 98% on room air.

HEENT:  Normocephalic, atraumatic.

Neck:  Supple.

Heart:  Regular.  S1, S2.

Chest:  Clear.

Abdomen:  Soft.  Bowel sounds are positive.

Extremities:  No cyanosis or edema.

 

LABORATORY DATA:  WBC is 5.5, hemoglobin 12, hematocrit 33.9 with a platelet count of

240,000.  D-dimer is 474.  BUN is 8, creatinine 0.5.

 

Chest CT as noted earlier.

 

COVID serology pending.

 

IMPRESSION:

1.  Chest pain, dyspnea.  Doubt pulmonary embolism with normal D-dimer, although

cannot completely exclude.

2.  Rule out possible lupus pleuritis.

3.  History of systemic lupus erythematosus.

4.  History of deep venous thrombosis, unprovoked; never worked up for

hypercoagulable state.

5.  Asthma, stable.

6.  Seizure disorder.

7.  Hypertension.

8.  History of gastric bypass.

9.  Obesity.

 

PLAN:  Supplemental O2, analgesics and add bronchodilators as needed.  Obtain ESR,

CRP, echocardiogram, duplex of lower extremities and V/Q.

 

 

ROCKY RDZ M.D.

 

RUBEN/[id number]

DD: 10/13/2020 12:21

DT: 10/13/2020 12:44

Job #:  [id number]

## 2020-10-13 NOTE — PN
Physical Exam: 


SUBJECTIVE: Patient seen and examined. Complains of difficulty breathing and 

taking a deep breath, due to chest pain. 








OBJECTIVE:





                                   Vital Signs











 Period  Temp  Pulse  Resp  BP Sys/Sepulveda  Pulse Ox


 


 Last 24 Hr  97.8 F-98.4 F    17-20  /57-76  

















GENERAL: Awake, alert. Not in acute distress.


HEENT: NCAT, EOMI, PERRL. Moist mucus membranes.


CARDIAC: RRR, S1, S2 present. No murmurs.


PULMONARY: CTA b/l. No wheezes or accessory muscle use.


ABDOMEN: Obese, nontender to palpation, nondistended. Normoactive bowel sounds


EXTREMITIES: Warm, well-perfused. No edema. 2+ pulses b/l.


SKIN: Tattoos on skin. Warm, dry.


                             Laboratory Last Values











WBC  5.5 K/mm3 (4.0-10.0)   10/13/20  06:09    


 


RBC  3.66 M/mm3 (3.60-5.2)   10/13/20  06:09    


 


Hgb  12.0 GM/dL (10.7-15.3)   10/13/20  06:09    


 


Hct  33.9 % (32.4-45.2)   10/13/20  06:09    


 


MCV  92.7 fl (80-96)   10/13/20  06:09    


 


MCH  32.8 pg (25.7-33.7)   10/13/20  06:09    


 


MCHC  35.4 g/dl (32.0-36.0)   10/13/20  06:09    


 


RDW  13.6 % (11.6-15.6)   10/13/20  06:09    


 


Plt Count  240 K/MM3 (134-434)   10/13/20  06:09    


 


MPV  8.2 fl (7.5-11.1)   10/13/20  06:09    


 


Absolute Neuts (auto)  2.4 K/mm3 (1.5-8.0)   10/13/20  06:09    


 


Neutrophils %  42.7 % (42.8-82.8)  L D 10/13/20  06:09    


 


Lymphocytes %  46.8 % (8-40)  H D 10/13/20  06:09    


 


Monocytes %  6.8 % (3.8-10.2)   10/13/20  06:09    


 


Eosinophils %  3.4 % (0-4.5)   10/13/20  06:09    


 


Basophils %  0.3 % (0-2.0)   10/13/20  06:09    


 


Nucleated RBC %  0 % (0-0)   10/13/20  06:09    


 


ESR  2 mm/hr (0-20)   10/13/20  13:00    


 


PT with INR  12.90 SEC (9.7-13.0)   10/13/20  06:09    


 


INR  1.10  (0.83-1.09)  H  10/13/20  06:09    


 


PTT (Actin FS)  35.7 SECONDS (25.2-36.5)   10/13/20  06:09    


 


Fibrinogen  284.0 mg/dL (238-498)   10/12/20  20:50    


 


D-Dimer  474 ng/ml (0-500)   10/12/20  19:50    


 


Sodium  140 mmol/L (136-145)   10/13/20  06:09    


 


Potassium  3.6 mmol/L (3.5-5.1)   10/13/20  06:09    


 


Chloride  110 mmol/L ()  H  10/13/20  06:09    


 


Carbon Dioxide  24 mmol/L (21-32)   10/13/20  06:09    


 


Anion Gap  5 MMOL/L (8-16)  L  10/13/20  06:09    


 


BUN  8.2 mg/dL (7-18)   10/13/20  06:09    


 


Creatinine  0.5 mg/dL (0.55-1.3)  L  10/13/20  06:09    


 


Est GFR (CKD-EPI)AfAm  142.30   10/13/20  06:09    


 


Est GFR (CKD-EPI)NonAf  122.78   10/13/20  06:09    


 


Random Glucose  89 mg/dL ()   10/13/20  06:09    


 


Hemoglobin A1c %  5.0 % (4.2-6.3)   10/13/20  06:09    


 


Calcium  8.1 mg/dL (8.5-10.1)  L  10/13/20  06:09    


 


Total Bilirubin  0.2 mg/dL (0.2-1)   10/13/20  06:09    


 


AST  14 U/L (15-37)  L  10/13/20  06:09    


 


ALT  38 U/L (13-61)   10/13/20  06:09    


 


Alkaline Phosphatase  67 U/L ()   10/13/20  06:09    


 


Creatine Kinase  88 U/L ()   10/12/20  15:20    


 


Troponin I  < 0.02 ng/ml (0.00-0.05)   10/12/20  20:50    


 


Total Protein  6.2 g/dl (6.4-8.2)  L  10/13/20  06:09    


 


Albumin  3.3 g/dl (3.4-5.0)  L  10/13/20  06:09    


 


Triglycerides  227 mg/dL (0-150)  H  10/13/20  06:09    


 


Cholesterol  169 mg/dL ()   10/13/20  06:09    


 


Total LDL Cholesterol  81 mg/dL (5-100)   10/13/20  06:09    


 


HDL Cholesterol  68 mg/dL (40-60)  H  10/13/20  06:09    


 


TSH  0.26 uIU/ml (0.358-3.74)  L  10/12/20  20:50    


 


Free T4  0.71 ng/dl (0.76-1.46)  L  10/12/20  20:50    


 


Resin T3 Uptake  30.2 % (30-39)   10/13/20  06:09    


 


Urine Color  Dk yellow   10/12/20  15:20    


 


Urine Appearance  Clear   10/12/20  15:20    


 


Urine pH  >= 9.0  (5.0-8.0)  H  10/12/20  15:20    


 


Ur Specific Gravity  1.024  (1.010-1.035)   10/12/20  15:20    


 


Urine Protein  1+  (NEGATIVE)  H  10/12/20  15:20    


 


Urine Glucose (UA)  Negative  (NEGATIVE)   10/12/20  15:20    


 


Urine Ketones  Negative  (NEGATIVE)   10/12/20  15:20    


 


Urine Blood  Negative  (NEGATIVE)   10/12/20  15:20    


 


Urine Nitrite  Negative  (NEGATIVE)   10/12/20  15:20    


 


Urine Bilirubin  Negative  (NEGATIVE)   10/12/20  15:20    


 


Urine Urobilinogen  0.2 mg/dL (0.2-1.0)   10/12/20  15:20    


 


Ur Leukocyte Esterase  Negative  (NEGATIVE)   10/12/20  15:20    


 


Urine WBC (Auto)  69.6 /uL (0-25.8)   10/12/20  15:20    


 


Urine RBC (Auto)  42 /uL (0-23.9)   10/12/20  15:20    


 


Urine Casts (Auto)  1 /uL (0-3.1)   10/12/20  15:20    


 


U Epithel Cells (Auto)  30 /uL (0-25.1)   10/12/20  15:20    


 


Urine Bacteria (Auto)  103 /uL (0-1359)   10/12/20  15:20    


 


Urine HCG, Qual  Negative   10/12/20  15:20    


 


Opiates Screen  Negative ng/ml (BMSBLM=043)   10/12/20  22:10    


 


Methadone Screen  Negative ng/ml (KCIESU=454)   10/12/20  22:10    


 


Barbiturate Screen  Negative ng/ml (WYSXRC=607)   10/12/20  22:10    


 


Phencyclidine Screen  Negative ng/ml (CUTOFF=25)   10/12/20  22:10    


 


Ur Amphetamines Screen  Negative ng/ml (EVVBVE=132)   10/12/20  22:10    


 


MDMA (Ecstasy) Screen  Negative ng/ml (HCJMAG=543)   10/12/20  22:10    


 


Benzodiazepines Screen  Negative ng/ml (KBACGP=353)   10/12/20  22:10    


 


Cocaine Screen  Negative ng/ml (KVAVVQ=801)   10/12/20  22:10    


 


U Marijuana (THC) Screen  Negative ng/ml (CUTOFF=50)   10/12/20  22:10    


 


COVID-19 (LUZ)  Not detected  (Not Detected)   10/12/20  17:00    








Active Medications





Carbamazepine (Tegretol -)  200 mg PO BID Pending sale to [facility]


   Last Admin: 10/13/20 10:32 Dose:  200 mg


   Documented by: 


Enoxaparin Sodium (Lovenox -)  100 mg SQ BID Pending sale to [facility]


   Last Admin: 10/13/20 10:33 Dose:  100 mg


   Documented by: 





CT chest 10/12


No aortic aneurysm is noted. Evaluation for possible aortic dissection cannot be

performed on this exam due to lack of intravascular contrast. Additional 

evaluation utilizing noncontrast MRI/MRA may be considered versus CT angiography

with utilization of a premedication protocol. The main pulmonary artery is 

somewhat dilated with a 3.3 cm diameter suggestive of increased pulmonary 

arterial pressure. Follow-up evaluation is suggested. No infiltrate or pleural 

effusion is seen. There appears to be diffuse enlargement of the partially 

imaged thyroid gland. Clinical/laboratory correlation is suggested as well as 

correlation with sonography. 





Duplex lower extremities 10/12


There is no sonographic evidence of deep vein thrombosis. If there is clinical 

concern for possible isolated calf DVT or if there is a clinical diagnosis of 

uncomplicated superficial thrombophlebitis, then correlation with close follow 

up sonography is sug gested. 





Echocardiogram 10/13


LV size, thickness and function are normal, EF 72%. Grade I diastolic 

dysfunction (abnormal relaxation pattern). RV is normal in size and function. 

Normal L and R atrial size and function. MV normal, AV and PV normal in 

structure and function





ASSESSMENT/PLAN:


39 yo F with PMH HTN, SLE, DVT (10 years ago, was treated with coumadin), 

gastric bypass in 2019, seizure disorder, obesity presented to ED with SOB and L

sided chest pain 4 days ago. Pt is currently admitted for suspicion of pulmonary

embolism.





Shortness of breath, etiology to be determined. Suspected PE vs. pleuritis 

secondary to viral infection


-Given history of SLE, DVT and obesity, pt has risks for PE


-CTA not done due to allergies to contrast


-CT chest as above, evidence of dilated main pulmonary artery, suggestive of INC

pulmonary arterial pressure.


-Echocardiogram and duplex lower extremities as above


-EKG without significant change from previous


-c/w lovenox 100mg BID


-f/u V/Q scan


- will f/u ESR, CRP


-Supplemental O2 to keep SpO2 >90%


-Pulmonary consulted.





Thyromegaly


-As per CT above


-Low TSH and low free T4


-Will need outpatient thyroid US.


-F/u total and free T3





Seizure disorder


-c/w home dose carbamazepine 200mg BID





Ppx


-DVT: lovenox





FEN


-no standing fluids


-replete lytes as needed


-low sodium diet





COVID negative 10/12





Dispo: continue to monitor on tele














Visit type





- Emergency Visit


Emergency Visit: Yes


ED Registration Date: 10/12/20


Care time: The patient presented to the Emergency Department on the above date 

and was hospitalized for further evaluation of their emergent condition.





- New Patient


This patient is new to me today: No





- Critical Care


Critical Care patient: No





ATTENDING PHYSICIAN STATEMENT





I saw and evaluated the patient.


I reviewed the resident's note and discussed the case with the resident.


I agree with the resident's findings and plan as documented.








SUBJECTIVE:








OBJECTIVE:








ASSESSMENT AND PLAN:

## 2020-10-13 NOTE — PN
Teaching Attending Note


Name of Resident: Fiona Manjarrez





ATTENDING PHYSICIAN STATEMENT





I saw and evaluated the patient.


I reviewed the resident's note and discussed the case with the resident.


I agree with the resident's findings and plan as documented.








SUBJECTIVE:


patient still with shortness of breath 


Sitting in bed, comfortable 





OBJECTIVE:


                                   Vital Signs











 Period  Temp  Pulse  Resp  BP Sys/Sepulveda  Pulse Ox


 


 Last 24 Hr  97.8 F-98.4 F    17-20  /57-76  








Physical exam as per resident note





ASSESSMENT AND PLAN:


40 F h/o SLE, DVT 10 years ago (was on Coumadin), gastric bypass 2019, seizure 

disorder, asthma, HTN, obesity, fibromyalgia, presents with sudden onset SOB and

L sided CP:





Shortness of breath


O2 support as needed 


Check 2D Echo 


V/Q scan pending 


CT chest with dilated Pulmonary artery 


Patient started overnight on theraputic Lovenox, Continue for now pending V/Q 

Scan 


Troponin Negative


Monitor on tele 





Hyperthyroidism


Check T3 and T4 


Patient denies any supplements 


Patient noted to have enlarged thyroid gland on CT scan 


patient will need Thyroid US as outpatient and possible RAIU scan if able 





Ppx


Lovenox

## 2020-10-13 NOTE — ECHO
Version:  1



Name:  GIOVANA RANGEL                                Exam: Adult Echocardiogram

MRN:  [id number]                                       Study Date:  10/13/2020, 1:32 PM

Age:  40 Years



 ____________________________________________________________________________________________________
__________



MMode/2D Measurements & Calculations





IVSd: 1.02 cm                                          LVIDs: 2.07 cm

LVIDd: 3.5 cm

LVPWd: 1.07 cm

LAV (MOD-bp):  28.1 ml

LVOT diam: 2.04 cm                                     Ao root diam: 2.6 cm

                                                       LA dimension: 3.1 cm



Doppler Measurements & Calculations



MV E max adal: 90.7 cm/sec                              Med E/e':  9.3

MV A max adal: 94.0 cm/sec                              Med Peak E' Adal:  9.8 cm/sec

MV E/A: 0.96

Lat E/e':  7.4

Lat Peak E' Adal:  12.3 cm/sec

MR max P.2 mmHg

Ao max PG: 10.3 mmHg

Ao V2 max: 160.4 cm/sec

                                                       TR max adal: 206.3 cm/sec

                                                       TR max P.0 mmHg



 Left Ventricle

 The left ventricular size, thickness and function are normal. EF 72%. Grade I diastolic dysfunction,


 (abnormal relaxation pattern).

 Right Ventricle

 The right ventricle is normal in size and function.

 Atria

 Normal left and right atrial size and function.

 Mitral Valve

 The mitral valve is grossly normal.

 Tricuspid Valve

 The tricuspid valve is not well visualized, but is grossly normal. Mild TR, PASP 26 mmHg.

 Aortic Valve

 The aortic valve is normal in structure and function.

 Pulmonic Valve

 The pulmonic valve is normal in structure and function.

 Great Vessels

 The aortic root is normal size.

 Pericardium/Pleura

 There is no pericardial effusion.







 Summary Statements

 The left ventricular size, thickness and function are normal

 EF 72%

 Grade I diastolic dysfunction, (abnormal relaxation pattern).

 The right ventricle is normal in size and function.

 Normal left and right atrial size and function.

 The mitral valve is grossly normal.

 The aortic valve is normal in structure and function.

 The pulmonic valve is normal in structure and function.





        MD João Song      10/13/2020, 2:30 PM

 Ordering Physician:  Basilio Bryson

 Performed By:  Lora Pastor

## 2020-10-14 LAB
ANION GAP SERPL CALC-SCNC: 7 MMOL/L (ref 8–16)
BUN SERPL-MCNC: 8 MG/DL (ref 7–18)
CALCIUM SERPL-MCNC: 8.3 MG/DL (ref 8.5–10.1)
CHLORIDE SERPL-SCNC: 110 MMOL/L (ref 98–107)
CO2 SERPL-SCNC: 25 MMOL/L (ref 21–32)
CREAT SERPL-MCNC: 0.6 MG/DL (ref 0.55–1.3)
DEPRECATED RDW RBC AUTO: 13.4 % (ref 11.6–15.6)
GLUCOSE SERPL-MCNC: 124 MG/DL (ref 74–106)
HCT VFR BLD CALC: 35 % (ref 32.4–45.2)
HGB BLD-MCNC: 12.1 GM/DL (ref 10.7–15.3)
MCH RBC QN AUTO: 31.9 PG (ref 25.7–33.7)
MCHC RBC AUTO-ENTMCNC: 34.7 G/DL (ref 32–36)
MCV RBC: 92 FL (ref 80–96)
PLATELET # BLD AUTO: 244 K/MM3 (ref 134–434)
PMV BLD: 8.1 FL (ref 7.5–11.1)
POTASSIUM SERPLBLD-SCNC: 3.6 MMOL/L (ref 3.5–5.1)
RBC # BLD AUTO: 3.81 M/MM3 (ref 3.6–5.2)
SODIUM SERPL-SCNC: 142 MMOL/L (ref 136–145)
WBC # BLD AUTO: 5.4 K/MM3 (ref 4–10)

## 2020-10-14 RX ADMIN — ENOXAPARIN SODIUM SCH MG: 100 INJECTION SUBCUTANEOUS at 12:43

## 2020-10-14 NOTE — PN
Teaching Attending Note


Name of Resident: Fiona Manjarrez





ATTENDING PHYSICIAN STATEMENT





I saw and evaluated the patient.


I reviewed the resident's note and discussed the case with the resident.


I agree with the resident's findings and plan as documented.








SUBJECTIVE:


Patient still short of breath, not requiring o2 





OBJECTIVE:


                                   Vital Signs











 Period  Temp  Pulse  Resp  BP Sys/Sepulveda  Pulse Ox


 


 Last 24 Hr  97.0 F-98.2 F    20-24  112-130/67-85  93-97








Physical exam as per resident note 





ASSESSMENT AND PLAN:


40 F h/o SLE, DVT 10 years ago (was on Coumadin), gastric bypass 2019, seizure 

disorder, asthma, HTN, obesity, fibromyalgia, presents with sudden onset SOB and

L sided CP:





Shortness of breath


O2 support as needed 


Echo without R heart Strain 


VQ scan negative 


CT chest with dilated Pulmonary artery 


DC theraputic lovenox at this time 


Troponin Negative


Monitor on tele 


Appreciate Pulm recs 





Hyperthyroidism


Patient denies any supplement use, T3 and T4 normal 


Patient noted to have enlarged thyroid gland on CT scan 


patient will need Thyroid US as outpatient and possible RAIU scan if able 





Ppx


Lovenox

## 2020-10-14 NOTE — PN
Progress Note, Physician


History of Present Illness: 





pulmonary





alert,no change ,c/o sob and cp





- Current Medication List


Current Medications: 


Active Medications





Carbamazepine (Tegretol -)  200 mg PO BID [facility]


   Last Admin: 10/13/20 21:37 Dose:  200 mg


   Documented by: 


Enoxaparin Sodium (Lovenox -)  100 mg SQ BID [facility]


   Last Admin: 10/13/20 21:37 Dose:  100 mg


   Documented by: 











- Objective


Vital Signs: 


                                   Vital Signs











Temperature  97.0 F L  10/14/20 06:33


 


Pulse Rate  75   10/14/20 06:33


 


Respiratory Rate  20   10/14/20 06:33


 


Blood Pressure  130/85   10/14/20 06:33


 


O2 Sat by Pulse Oximetry (%)  93 L  10/14/20 02:00











Constitutional: Yes: Well Nourished, Calm


Eyes: Yes: WNL


HENT: Yes: WNL


Neck: Yes: WNL


Cardiovascular: Yes: Regular Rate and Rhythm, S1, S2


Respiratory: Yes: CTA Bilaterally


Gastrointestinal: Yes: Normal Bowel Sounds, Soft


Extremities: Yes: WNL


Edema: No


Labs: 


                                    CBC, BMP





                                 10/14/20 06:35 





                                 10/14/20 06:35 





                                    INR, PTT











INR  1.10  (0.83-1.09)  H  10/13/20  06:09    


 


Fibrinogen  284.0 mg/dL (238-498)   10/12/20  20:50    








                                Laboratory Tests











  10/13/20





  13:00


 


ESR  2














Problem List





- Problems


(1) HTN (hypertension)


Code(s): I10 - ESSENTIAL (PRIMARY) HYPERTENSION   





(2) Chest pain


Code(s): R07.9 - CHEST PAIN, UNSPECIFIED   


Qualifiers: 


   Chest pain type: chest pain on breathing   Qualified Code(s): R07.1 - Chest 

pain on breathing; R07.81 - Pleurodynia   





(3) Shortness of breath


Code(s): R06.02 - SHORTNESS OF BREATH   





(4) SLE (systemic lupus erythematosus)


Code(s): M32.9 - SYSTEMIC LUPUS ERYTHEMATOSUS, UNSPECIFIED   





(5) H/O deep venous thrombosis


Code(s): Z86.718 - PERSONAL HISTORY OF OTHER VENOUS THROMBOSIS AND EMBOLISM   





Assessment/Plan








IMP CP/DYSPNEA  DOUBT PE


      H/O SLE


      H/O DVT UNPROVOKED NEVER WORKED UP FOR HYPER-COAGULABLE STATE


      DIASTOLIC DYSFUNCTION


      ASTHMA


      SEIZURE DISORDER


      HTN


      H/O GASTRIC BYPASS


      OBESITY





PLAN O2 AS NEEDED


       ANALGESICS


       INHALED BRONCHODILATORS AS NEEDED


       V/Q








DR RDZ


       


       


    


     





 Problem List 





- Problems


(1) HTN (hypertension)


Code(s): I10 - ESSENTIAL (PRIMARY) HYPERTENSION   





(2) Chest pain


Code(s): R07.9 - CHEST PAIN, UNSPECIFIED   


Qualifiers: 


   Chest pain type: chest pain on breathing   Qualified Code(s): R07.1 - Chest 

pain on breathing; R07.81 - Pleurodynia   





(3) Shortness of breath


Code(s): R06.02 - SHORTNESS OF BREATH   





(4) SLE (systemic lupus erythematosus)


Code(s): M32.9 - SYSTEMIC LUPUS ERYTHEMATOSUS, UNSPECIFIED   





(5) H/O deep venous thrombosis


Code(s): Z86.718 - PERSONAL HISTORY OF OTHER VENOUS THROMBOSIS AND EMBOLISM

## 2020-10-14 NOTE — PN
Physical Exam: 


SUBJECTIVE: Patient seen and examined. Overnight, pt complained of chest pain on

inspiration and her chest felt "heavy". Given 1g ofirmev. Pt stated this morning

that ofirmev had given her minimal relief.








OBJECTIVE:





                                   Vital Signs











 Period  Temp  Pulse  Resp  BP Sys/Sepulveda  Pulse Ox


 


 Last 24 Hr  97.0 F-98.1 F    20-24  109-130/67-85  93-97








GENERAL: Awake, alert. Not in acute distress.


HEENT: NCAT, EOMI, PERRL. Moist mucus membranes.


CARDIAC: RRR, S1, S2 present. No murmurs.


PULMONARY: CTA b/l. No wheezes or accessory muscle use.


ABDOMEN: Obese, nontender to palpation, nondistended. Normoactive bowel sounds


EXTREMITIES: Warm, well-perfused. No edema. 2+ pulses b/l.


SKIN: Tattoos on skin. Warm, dry.


                                        





                             Laboratory Last Values











WBC  5.4 K/mm3 (4.0-10.0)   10/14/20  06:35    


 


RBC  3.81 M/mm3 (3.60-5.2)   10/14/20  06:35    


 


Hgb  12.1 GM/dL (10.7-15.3)   10/14/20  06:35    


 


Hct  35.0 % (32.4-45.2)   10/14/20  06:35    


 


MCV  92.0 fl (80-96)   10/14/20  06:35    


 


MCH  31.9 pg (25.7-33.7)   10/14/20  06:35    


 


MCHC  34.7 g/dl (32.0-36.0)   10/14/20  06:35    


 


RDW  13.4 % (11.6-15.6)   10/14/20  06:35    


 


Plt Count  244 K/MM3 (134-434)   10/14/20  06:35    


 


MPV  8.1 fl (7.5-11.1)   10/14/20  06:35    


 


Absolute Neuts (auto)  2.4 K/mm3 (1.5-8.0)   10/13/20  06:09    


 


Neutrophils %  42.7 % (42.8-82.8)  L D 10/13/20  06:09    


 


Lymphocytes %  46.8 % (8-40)  H D 10/13/20  06:09    


 


Monocytes %  6.8 % (3.8-10.2)   10/13/20  06:09    


 


Eosinophils %  3.4 % (0-4.5)   10/13/20  06:09    


 


Basophils %  0.3 % (0-2.0)   10/13/20  06:09    


 


Nucleated RBC %  0 % (0-0)   10/13/20  06:09    


 


ESR  2 mm/hr (0-20)   10/13/20  13:00    


 


PT with INR  12.90 SEC (9.7-13.0)   10/13/20  06:09    


 


INR  1.10  (0.83-1.09)  H  10/13/20  06:09    


 


PTT (Actin FS)  38.1 SECONDS (25.2-36.5)  H  10/14/20  06:35    


 


Fibrinogen  284.0 mg/dL (238-498)   10/12/20  20:50    


 


D-Dimer  474 ng/ml (0-500)   10/12/20  19:50    


 


Sodium  142 mmol/L (136-145)   10/14/20  06:35    


 


Potassium  3.6 mmol/L (3.5-5.1)   10/14/20  06:35    


 


Chloride  110 mmol/L ()  H  10/14/20  06:35    


 


Carbon Dioxide  25 mmol/L (21-32)   10/14/20  06:35    


 


Anion Gap  7 MMOL/L (8-16)  L  10/14/20  06:35    


 


BUN  8.0 mg/dL (7-18)   10/14/20  06:35    


 


Creatinine  0.6 mg/dL (0.55-1.3)   10/14/20  06:35    


 


Est GFR (CKD-EPI)AfAm  134.01   10/14/20  06:35    


 


Est GFR (CKD-EPI)NonAf  115.63   10/14/20  06:35    


 


Random Glucose  124 mg/dL ()  H  10/14/20  06:35    


 


Hemoglobin A1c %  5.0 % (4.2-6.3)   10/13/20  06:09    


 


Calcium  8.3 mg/dL (8.5-10.1)  L  10/14/20  06:35    


 


Total Bilirubin  0.2 mg/dL (0.2-1)   10/13/20  06:09    


 


AST  14 U/L (15-37)  L  10/13/20  06:09    


 


ALT  38 U/L (13-61)   10/13/20  06:09    


 


Alkaline Phosphatase  67 U/L ()   10/13/20  06:09    


 


Creatine Kinase  88 U/L ()   10/12/20  15:20    


 


Troponin I  < 0.02 ng/ml (0.00-0.05)   10/12/20  20:50    


 


C-Reactive Protein  < 0.3 MG/DL (0.00-0.3)   10/14/20  06:35    


 


Total Protein  6.2 g/dl (6.4-8.2)  L  10/13/20  06:09    


 


Albumin  3.3 g/dl (3.4-5.0)  L  10/13/20  06:09    


 


Triglycerides  227 mg/dL (0-150)  H  10/13/20  06:09    


 


Cholesterol  169 mg/dL ()   10/13/20  06:09    


 


Total LDL Cholesterol  81 mg/dL (5-100)   10/13/20  06:09    


 


HDL Cholesterol  68 mg/dL (40-60)  H  10/13/20  06:09    


 


TSH  0.26 uIU/ml (0.358-3.74)  L  10/12/20  20:50    


 


Free T4  0.71 ng/dl (0.76-1.46)  L  10/12/20  20:50    


 


Free T3  2.3 pg/ml (2.0-4.4)   10/13/20  14:58    


 


Resin T3 Uptake  30.2 % (30-39)   10/13/20  06:09    


 


Urine Color  Dk yellow   10/12/20  15:20    


 


Urine Appearance  Clear   10/12/20  15:20    


 


Urine pH  >= 9.0  (5.0-8.0)  H  10/12/20  15:20    


 


Ur Specific Gravity  1.024  (1.010-1.035)   10/12/20  15:20    


 


Urine Protein  1+  (NEGATIVE)  H  10/12/20  15:20    


 


Urine Glucose (UA)  Negative  (NEGATIVE)   10/12/20  15:20    


 


Urine Ketones  Negative  (NEGATIVE)   10/12/20  15:20    


 


Urine Blood  Negative  (NEGATIVE)   10/12/20  15:20    


 


Urine Nitrite  Negative  (NEGATIVE)   10/12/20  15:20    


 


Urine Bilirubin  Negative  (NEGATIVE)   10/12/20  15:20    


 


Urine Urobilinogen  0.2 mg/dL (0.2-1.0)   10/12/20  15:20    


 


Ur Leukocyte Esterase  Negative  (NEGATIVE)   10/12/20  15:20    


 


Urine WBC (Auto)  69.6 /uL (0-25.8)   10/12/20  15:20    


 


Urine RBC (Auto)  42 /uL (0-23.9)   10/12/20  15:20    


 


Urine Casts (Auto)  1 /uL (0-3.1)   10/12/20  15:20    


 


U Epithel Cells (Auto)  30 /uL (0-25.1)   10/12/20  15:20    


 


Urine Bacteria (Auto)  103 /uL (0-1359)   10/12/20  15:20    


 


Urine HCG, Qual  Negative   10/12/20  15:20    


 


Opiates Screen  Negative ng/ml (AZGZVC=788)   10/12/20  22:10    


 


Methadone Screen  Negative ng/ml (RBJHLZ=895)   10/12/20  22:10    


 


Barbiturate Screen  Negative ng/ml (BAWMBN=898)   10/12/20  22:10    


 


Phencyclidine Screen  Negative ng/ml (CUTOFF=25)   10/12/20  22:10    


 


Ur Amphetamines Screen  Negative ng/ml (FMPECA=151)   10/12/20  22:10    


 


MDMA (Ecstasy) Screen  Negative ng/ml (YXFONK=321)   10/12/20  22:10    


 


Benzodiazepines Screen  Negative ng/ml (KHCHSS=246)   10/12/20  22:10    


 


Cocaine Screen  Negative ng/ml (WQOMEO=389)   10/12/20  22:10    


 


U Marijuana (THC) Screen  Negative ng/ml (CUTOFF=50)   10/12/20  22:10    


 


COVID-19 ([facility])  Not detected  (Not Detected)   10/12/20  17:00    








Active Medications





Carbamazepine (Tegretol -)  200 mg PO BID [facility]


   Last Admin: 10/14/20 12:43 Dose:  200 mg


   Documented by: 


Enoxaparin Sodium (Lovenox -)  100 mg SQ BID [facility]


   Last Admin: 10/14/20 12:43 Dose:  100 mg


   Documented by: 








CT chest 10/12


No aortic aneurysm is noted. Evaluation for possible aortic dissection cannot be

performed on this exam due to lack of intravascular contrast. Additional 

evaluation utilizing noncontrast MRI/MRA may be considered versus CT angiography

with utilization of a premedication protocol. The main pulmonary artery is 

somewhat dilated with a 3.3 cm diameter suggestive of increased pulmonary 

arterial pressure. Follow-up evaluation is suggested. No infiltrate or pleural 

effusion is seen. There appears to be diffuse enlargement of the partially 

imaged thyroid gland. Clinical/laboratory correlation is suggested as well as 

correlation with sonography. 





Duplex lower extremities 10/12


There is no sonographic evidence of deep vein thrombosis. If there is clinical 

concern for possible isolated calf DVT or if there is a clinical diagnosis of 

uncomplicated superficial thrombophlebitis, then correlation with close follow 

up sonography is sug gested. 





Echocardiogram 10/13


LV size, thickness and function are normal, EF 72%. Grade I diastolic 

dysfunction (abnormal relaxation pattern). RV is normal in size and function. 

Normal L and R atrial size and function. MV normal, AV and PV normal in 

structure and function





V/Q scan 10/14


There is normal ventilation and perfusion to the right and left lungs with no 

evidence of mismatched perfusion defects. 





CXR 10/14


2 views of the chest were submitted. There are clear well aerated lungs, normal 

mediastinum and sharp angles. The soft tissues are intact. Acute process is not 

seen. There is a new ringlike density projects over the peripheral left chest 

which was not present on 10/12/2020 and most likely is artifactual. The bones 

are intact. Correlation recommended. 





ASSESSMENT/PLAN:


39 yo F with PMH HTN, SLE, DVT (10 years ago, was treated with coumadin), 

gastric bypass in 2019, seizure disorder, obesity presented to ED with SOB and L

sided chest pain 4 days ago. Pt is currently admitted for suspicion of pulmonary

embolism.





Shortness of breath, etiology to be determined. Pleuritis secondary to viral 

infection vs. costochondritis vs. Unlikely PE vs. Pericarditis


-Given history of SLE, DVT and obesity, pt has risks for PE. Negative V/Q scan 

and echo for R heart strain makes PE unlikely


-CTA not done due to allergies to contrast


-CT chest as above, evidence of dilated main pulmonary artery, suggestive of INC

pulmonary arterial pressure.


-Echocardiogram and duplex lower extremities as above


-EKG without significant change from previous


- V/Q scan normal


- d/c lovenox 100mg BID given negative V/Q


- ESR and CRP normal


-Supplemental O2 to keep SpO2 >90%


-Pulmonary consulted.


-c/w acetaminophen 650mg q6h PRN for pain. If insufficient analgesia, will 

consider NSAID





Thyromegaly


-As per CT above


-Low TSH and low free T4


-Will need outpatient thyroid US.


-F/u total and free T3





Seizure disorder


-c/w home dose carbamazepine 200mg BID





Ppx


-DVT: lovenox





FEN


-no standing fluids


-replete lytes as needed


-low sodium diet





COVID negative 10/12





Dispo: continue to monitor on tele








Visit type





- Emergency Visit


Emergency Visit: Yes


ED Registration Date: 10/12/20


Care time: The patient presented to the Emergency Department on the above date 

and was hospitalized for further evaluation of their emergent condition.





- New Patient


This patient is new to me today: No





- Critical Care


Critical Care patient: No





ATTENDING PHYSICIAN STATEMENT





I saw and evaluated the patient.


I reviewed the resident's note and discussed the case with the resident.


I agree with the resident's findings and plan as documented.








SUBJECTIVE:








OBJECTIVE:








ASSESSMENT AND PLAN:

## 2020-10-15 LAB
ANION GAP SERPL CALC-SCNC: 6 MMOL/L (ref 8–16)
BUN SERPL-MCNC: 8.8 MG/DL (ref 7–18)
CALCIUM SERPL-MCNC: 8.7 MG/DL (ref 8.5–10.1)
CHLORIDE SERPL-SCNC: 112 MMOL/L (ref 98–107)
CO2 SERPL-SCNC: 25 MMOL/L (ref 21–32)
CREAT SERPL-MCNC: 0.6 MG/DL (ref 0.55–1.3)
DEPRECATED RDW RBC AUTO: 13.4 % (ref 11.6–15.6)
GLUCOSE SERPL-MCNC: 96 MG/DL (ref 74–106)
HCT VFR BLD CALC: 36 % (ref 32.4–45.2)
HGB BLD-MCNC: 12.5 GM/DL (ref 10.7–15.3)
MAGNESIUM SERPL-MCNC: 2 MG/DL (ref 1.8–2.4)
MCH RBC QN AUTO: 32 PG (ref 25.7–33.7)
MCHC RBC AUTO-ENTMCNC: 34.6 G/DL (ref 32–36)
MCV RBC: 92.4 FL (ref 80–96)
PLATELET # BLD AUTO: 249 K/MM3 (ref 134–434)
PMV BLD: 7.8 FL (ref 7.5–11.1)
POTASSIUM SERPLBLD-SCNC: 3.9 MMOL/L (ref 3.5–5.1)
RBC # BLD AUTO: 3.9 M/MM3 (ref 3.6–5.2)
SODIUM SERPL-SCNC: 143 MMOL/L (ref 136–145)
WBC # BLD AUTO: 4.8 K/MM3 (ref 4–10)

## 2020-10-15 NOTE — CON.CARD
Consult


Consult Specialty:: cardiology


Reason for Consultation:: chest pain





- History of Present Illness


Chief Complaint: Pt A&Ox3; during physical exam, asked to take deep breaths, the

central chest pain is elicited (mderately intense; sharp; disappears between 

breaths).


History of Present Illness: 





Ms. Zuluaga is a 39 y/o black female with a PMH of SLE and RA (on Paquenil and 

Prednisone), HTN, seizures, DVT 10 years ago, morbid obesity (s/p gastric bypass

surgery 2019; weighed nearly 300 lbs at the time); anxiety; elevated 

triglycerides, ?hypothyroidism, now presenting to the ED because of 4 days of 

chest pain. The pain started abruptly; denies any inciting incident. It is 

constant, sharp, left sided, and occasionally radiates to her back. The pain is 

worse with deep breaths. She is also having increased SOB, worse with exertion 

and is also complaining of lightheadedness. She denies any LOC. She has 

experienced similar pain before (beginning about a year ago), but never this 

bad. She denies any associated N/V, abdominal pain, headache, blurry vision, or 

pain in her calves. Admits to DVT over 10 years ago, and she was anticoagulated 

with Coumadin. No longer on anticoagulants. Patient reports IV contrast allergy;

it makes her SOB. 





Pt says she walks regularly, including up and down stairs, but has been 

increasingly dyspneic for the past week.





She denies recent travel, sick contacts, hx of malignancy, cough, or hemoptysis.










- History Source


History Provided By: Patient, Medical Record


Limitations to Obtaining History: No Limitations





- Past Medical History


Cardio/Vascular: Yes: Deep Vein Thrombosis, HTN


Reproductive: No: Postmenopausal


...LMP: 10/06/20


...Pregnant: No


Heme/Onc: No: Anemia





- Alcohol/Substance Use


Hx Alcohol Use: No





- Smoking History


Smoking history: Never smoked


Have you smoked in the past 12 months: No





Home Medications





- Allergies


Allergies/Adverse Reactions: 


                                    Allergies











Allergy/AdvReac Type Severity Reaction Status Date / Time


 


Iodinated Contrast Media Allergy   Verified 10/12/20 15:00


 


sumatriptan [From Imitrex] Allergy  Difficulty Verified 10/12/20 15:00





   Breathing  














- Home Medications


Home Medications: 


Ambulatory Orders





Acetaminophen [Tylenol .Regular Strength -] 650 mg PO Q6H PRN #60 tablet 

10/15/20 


Amitriptyline HCl [Elavil -] 150 mg PO HS 10/15/20 


Carbamazepine [Carbamazepine ER] 200 mg PO Q12H 10/15/20 


Hydroxychloroquine Sulfate [Plaquenil] 200 mg PO DAILY 10/15/20 


Prednisone 5 mg PO BID 10/15/20 


Topiramate [Topamax] 50 mg PO BID 10/15/20 











Family Medical History


Family Hx Cardiac Disorders: Grandmother (maternal) ( CVA in her ?60s)





Review of Systems





- Review of Systems


Constitutional: reports: Weakness


Eyes: reports: No Symptoms


HENT: reports: No Symptoms


Neck: reports: No Symptoms


Cardiovascular: reports: Chest Pain


Respiratory: reports: SOB on Exertion


Gastrointestinal: reports: No Symptoms


Genitourinary: reports: No Symptoms


Breasts: reports: No Symptoms Reported


Musculoskeletal: reports: Joint Pain


Integumentary: reports: No Symptoms


Neurological: reports: Weakness


Endocrine: reports: Other


Hematology/Lymphatic: reports: No Symptoms


Psychiatric: reports: Anxiety





- Risk Factors


Known Risk Factors: Yes: Diabetes Mellitus, Family History, Gender, 

Hypercholesterolemia, Hypertension, Race, Other (diastolic CHF).  No: Smoking


Vital Signs: 


                                   Vital Signs











Temperature  98.2 F   10/15/20 10:00


 


Pulse Rate  88   10/15/20 10:00


 


Respiratory Rate  22 H  10/15/20 10:00


 


Blood Pressure  125/7 L  10/15/20 10:00


 


O2 Sat by Pulse Oximetry (%)  95   10/15/20 10:00











Constitutional: Yes: Calm


Eyes: Yes: WNL


HENT: Yes: WNL


Neck: Yes: WNL


Respiratory: Yes: Regular, Diminished, Other


Gastrointestinal: Yes: Soft


Renal/: No: Anuria


Cardiovascular: Yes: Regular Rate and Rhythm


JVD: No


Carotid Bruit: No


Heart Sounds: Yes: S1, S2


Murmur: Yes: Systolic Murmur, Grade 1


Musculoskeletal: Yes: WNL, Joint Stiffness


Extremities: Yes: WNL


Edema: No


Peripheral Pulses WNL: Yes


Integumentary: Yes: Tattoos


Neurological: Yes: WNL


Psychiatric: Yes: Alert, Oriented, Other





- Other Data


Labs, Other Data: 


                                    CBC, BMP





                                 10/15/20 05:50 





                                 10/15/20 05:50 





                                    INR, PTT











INR  1.10  (0.83-1.09)  H  10/13/20  06:09    


 


Fibrinogen  284.0 mg/dL (238-498)   10/12/20  20:50    











Echo: Report Reviewed


Ejection Fraction %: LVEF > or = 40 %





Imaging





- Results


Chest X-ray: Image Reviewed


EKG: Image Reviewed





Assessment/Plan





Atypical chest pain for the past year; elicited by deep breaths. 


SLE; RA


HTN


morbid obesity; s/p gastric bypass surgery


hypertriglyceridemia


seizures


?thyroid dysfunction


DVT 2010; not on anticoagulants after a few months of warfarin at that time


EKG NSR; septal infarct (noted since at least 2016)


ECHO: normal LVEF; abnormal diastolic compliance; no significant valvular 

disease.


TNI < 0.02 x 2; CK WNL.


CT chest: no aortic aneurysm; unable to assess for aortic dissection 

(noncontrast; pt allergic to contrast); diffuse thyroid enlargement





Rec:


Repeat EKG


Chest MRI to r/o aortic dissection


f/u with PMD, rheumatologist (on Plaquenil, Prednisone)


f/u thyroid findings


Stress treadmill ECHO.

## 2020-10-15 NOTE — PN
Teaching Attending Note


Name of Resident: Fiona Manjarrez





ATTENDING PHYSICIAN STATEMENT





I saw and evaluated the patient.


I reviewed the resident's note and discussed the case with the resident.


I agree with the resident's findings and plan as documented.








SUBJECTIVE:


Patient no longer short of breath 


Still with chest pain--not reproducible 





OBJECTIVE:


                                   Vital Signs











 Period  Temp  Pulse  Resp  BP Sys/Sepulveda  Pulse Ox


 


 Last 24 Hr  97.9 F-98.3 F  71-96  20-22  114-133/7-86  95-97








Physical Exam as per resident note 


Chest pain improves with leaning forward 


No pericardial rub auscultated 





ASSESSMENT AND PLAN:


40 F h/o SLE, DVT 10 years ago (was on Coumadin), gastric bypass 2019, seizure 

disorder, asthma, HTN, obesity, fibromyalgia, presents with sudden onset SOB and

L sided CP:





Shortness of breath: Resolved


Echo, VQ Scan, CT Chest Reviewed 


Ruled out PE, ACS


Can be Pericarditis, however patient does not have any EKG changes suggestive of

Pericarditis. No elevation in CRP/ESR 


Given patient's Hx of Gastric bypass, risks of presumptive treatment of 

pericarditis with high dose nsaids outweigh benefits 


Defer to Cardiology assessment 


Appreciate Pulm recs 





Hyperthyroidism


Patient denies any supplement use, T3 and T4 normal 


Patient noted to have enlarged thyroid gland on CT scan 


patient will need Thyroid US as outpatient and possible RAIU scan if able 





SLE


Continue Plaquinil, Prednisone 





Migraine


Continue Topomax 





Seizure disorder 


continue carbemazapine 


Ppx


Lovenox


Dispo: 


Can discharge home today pending cardilogy eval

## 2020-10-15 NOTE — DS
Physical Exam: 


SUBJECTIVE: Patient seen and examined. Pt continues to complain of chest 

discomfort without relief. No acute events on telemetry. Pt stated discomfort 

improves with leaning forward and worsens with deep inspiration. 








OBJECTIVE:





                                   Vital Signs











 Period  Temp  Pulse  Resp  BP Sys/Sepulveda  Pulse Ox


 


 Last 24 Hr  97.9 F-98.3 F  79-96  20-22  114-133/7-86  95-97








PHYSICAL EXAM





GENERAL: Awake, alert. Not in acute distress.


HEENT: NCAT, EOMI, PERRL. Moist mucus membranes.


CARDIAC: RRR, S1, S2 present. No murmurs. No pericardial rub.


PULMONARY: CTA b/l. No wheezes or accessory muscle use.


ABDOMEN: Obese, nontender to palpation, nondistended. Normoactive bowel sounds


EXTREMITIES: Warm, well-perfused. No edema. 2+ pulses b/l.


SKIN: Tattoos on skin. Warm, dry.





LABS


                                        


                             Laboratory Last Values











WBC  4.8 K/mm3 (4.0-10.0)   10/15/20  05:50    


 


RBC  3.90 M/mm3 (3.60-5.2)   10/15/20  05:50    


 


Hgb  12.5 GM/dL (10.7-15.3)   10/15/20  05:50    


 


Hct  36.0 % (32.4-45.2)   10/15/20  05:50    


 


MCV  92.4 fl (80-96)   10/15/20  05:50    


 


MCH  32.0 pg (25.7-33.7)   10/15/20  05:50    


 


MCHC  34.6 g/dl (32.0-36.0)   10/15/20  05:50    


 


RDW  13.4 % (11.6-15.6)   10/15/20  05:50    


 


Plt Count  249 K/MM3 (134-434)   10/15/20  05:50    


 


MPV  7.8 fl (7.5-11.1)   10/15/20  05:50    


 


Absolute Neuts (auto)  2.4 K/mm3 (1.5-8.0)   10/13/20  06:09    


 


Neutrophils %  42.7 % (42.8-82.8)  L D 10/13/20  06:09    


 


Lymphocytes %  46.8 % (8-40)  H D 10/13/20  06:09    


 


Monocytes %  6.8 % (3.8-10.2)   10/13/20  06:09    


 


Eosinophils %  3.4 % (0-4.5)   10/13/20  06:09    


 


Basophils %  0.3 % (0-2.0)   10/13/20  06:09    


 


Nucleated RBC %  0 % (0-0)   10/13/20  06:09    


 


ESR  2 mm/hr (0-20)   10/13/20  13:00    


 


PT with INR  12.90 SEC (9.7-13.0)   10/13/20  06:09    


 


INR  1.10  (0.83-1.09)  H  10/13/20  06:09    


 


PTT (Actin FS)  33.6 SECONDS (25.2-36.5)   10/15/20  05:50    


 


Fibrinogen  284.0 mg/dL (238-498)   10/12/20  20:50    


 


D-Dimer  474 ng/ml (0-500)   10/12/20  19:50    


 


Sodium  143 mmol/L (136-145)   10/15/20  05:50    


 


Potassium  3.9 mmol/L (3.5-5.1)   10/15/20  05:50    


 


Chloride  112 mmol/L ()  H  10/15/20  05:50    


 


Carbon Dioxide  25 mmol/L (21-32)   10/15/20  05:50    


 


Anion Gap  6 MMOL/L (8-16)  L  10/15/20  05:50    


 


BUN  8.8 mg/dL (7-18)   10/15/20  05:50    


 


Creatinine  0.6 mg/dL (0.55-1.3)   10/15/20  05:50    


 


Est GFR (CKD-EPI)AfAm  134.01   10/15/20  05:50    


 


Est GFR (CKD-EPI)NonAf  115.63   10/15/20  05:50    


 


Random Glucose  96 mg/dL ()   10/15/20  05:50    


 


Hemoglobin A1c %  5.0 % (4.2-6.3)   10/13/20  06:09    


 


Calcium  8.7 mg/dL (8.5-10.1)   10/15/20  05:50    


 


Magnesium  2.0 mg/dL (1.8-2.4)   10/15/20  05:50    


 


Total Bilirubin  0.2 mg/dL (0.2-1)   10/13/20  06:09    


 


AST  14 U/L (15-37)  L  10/13/20  06:09    


 


ALT  38 U/L (13-61)   10/13/20  06:09    


 


Alkaline Phosphatase  67 U/L ()   10/13/20  06:09    


 


Creatine Kinase  88 U/L ()   10/12/20  15:20    


 


Troponin I  < 0.02 ng/ml (0.00-0.05)   10/12/20  20:50    


 


C-Reactive Protein  < 0.3 MG/DL (0.00-0.3)   10/14/20  06:35    


 


Total Protein  6.2 g/dl (6.4-8.2)  L  10/13/20  06:09    


 


Albumin  3.3 g/dl (3.4-5.0)  L  10/13/20  06:09    


 


Triglycerides  227 mg/dL (0-150)  H  10/13/20  06:09    


 


Cholesterol  169 mg/dL ()   10/13/20  06:09    


 


Total LDL Cholesterol  81 mg/dL (5-100)   10/13/20  06:09    


 


HDL Cholesterol  68 mg/dL (40-60)  H  10/13/20  06:09    


 


TSH  0.26 uIU/ml (0.358-3.74)  L  10/12/20  20:50    


 


Free T4  0.71 ng/dl (0.76-1.46)  L  10/12/20  20:50    


 


Free T3  2.3 pg/ml (2.0-4.4)   10/13/20  14:58    


 


Total T3  108.00 ng/dl ()   10/14/20  06:35    


 


Resin T3 Uptake  30.2 % (30-39)   10/13/20  06:09    


 


Urine Color  Dk yellow   10/12/20  15:20    


 


Urine Appearance  Clear   10/12/20  15:20    


 


Urine pH  >= 9.0  (5.0-8.0)  H  10/12/20  15:20    


 


Ur Specific Gravity  1.024  (1.010-1.035)   10/12/20  15:20    


 


Urine Protein  1+  (NEGATIVE)  H  10/12/20  15:20    


 


Urine Glucose (UA)  Negative  (NEGATIVE)   10/12/20  15:20    


 


Urine Ketones  Negative  (NEGATIVE)   10/12/20  15:20    


 


Urine Blood  Negative  (NEGATIVE)   10/12/20  15:20    


 


Urine Nitrite  Negative  (NEGATIVE)   10/12/20  15:20    


 


Urine Bilirubin  Negative  (NEGATIVE)   10/12/20  15:20    


 


Urine Urobilinogen  0.2 mg/dL (0.2-1.0)   10/12/20  15:20    


 


Ur Leukocyte Esterase  Negative  (NEGATIVE)   10/12/20  15:20    


 


Urine WBC (Auto)  69.6 /uL (0-25.8)   10/12/20  15:20    


 


Urine RBC (Auto)  42 /uL (0-23.9)   10/12/20  15:20    


 


Urine Casts (Auto)  1 /uL (0-3.1)   10/12/20  15:20    


 


U Epithel Cells (Auto)  30 /uL (0-25.1)   10/12/20  15:20    


 


Urine Bacteria (Auto)  103 /uL (0-1359)   10/12/20  15:20    


 


Urine HCG, Qual  Negative   10/12/20  15:20    


 


Opiates Screen  Negative ng/ml (BERSNI=501)   10/12/20  22:10    


 


Methadone Screen  Negative ng/ml (PBCIKK=941)   10/12/20  22:10    


 


Barbiturate Screen  Negative ng/ml (VUJYSO=876)   10/12/20  22:10    


 


Phencyclidine Screen  Negative ng/ml (CUTOFF=25)   10/12/20  22:10    


 


Ur Amphetamines Screen  Negative ng/ml (DRUFCY=774)   10/12/20  22:10    


 


MDMA (Ecstasy) Screen  Negative ng/ml (RCRCTC=639)   10/12/20  22:10    


 


Benzodiazepines Screen  Negative ng/ml (YZIRPR=213)   10/12/20  22:10    


 


Cocaine Screen  Negative ng/ml (ZLPHPW=263)   10/12/20  22:10    


 


U Marijuana (THC) Screen  Negative ng/ml (CUTOFF=50)   10/12/20  22:10    


 


COVID-19 ([facility])  Not detected  (Not Detected)   10/12/20  17:00    











HOSPITAL COURSE:


41 yo F with PMH HTN, SLE, DVT (10 years ago, was treated with coumadin), 

gastric bypass in 2019, seizure disorder, obesity presented to ED with SOB and L

sided chest pain 4 days ago. Pt was admitted for suspicion of pulmonary 

embolism. Cardiology and pulmonology consulted. Given history of SLE, DVT and 

obesity, pt had risks for PE. Pt placed on lovenox. However, negative V/Q scan 

and echo for R heart strain makes PE unlikely. CTA not done due to allergies to 

contrast. CT chest with evidence of dilated main pulmonary artery, suggestive of

increased pulmonary arterial pressure. Echocardiogram was without evidence of R 

heart strain and duplex lower extremities negative. EKG on admission was without

significant change from previous. Repeat EKG showed questionable changes in QRS 

axis and nonspecific T wave abnormality on lateral leads.  Pt may require chest 

MRI and outpatient PFTs. Pt is currently stable for discharge home.











Date of Admission:10/12/20





Date of Discharge: 10/15/20











Minutes to complete discharge: 36





Discharge Summary


Problems reviewed: Yes


Reason For Visit: SHORTNESS OF BREATH; CHEST PAIN


Current Active Problems





H/O deep venous thrombosis (Chronic)


HTN (hypertension) (Chronic)


SLE (systemic lupus erythematosus) (Chronic)


Shortness of breath (Chronic)








Condition: Stable





- Instructions


Diet, Activity, Other Instructions: 


Your visit:


You came to the hospital due to shortness of breath and pain when you take a 

deep breath. You were admitted to the hospital to evaluate your symptoms. You 

had imaging of your chest, heart, and legs. You did a test calleda, "V/Q scan" 

to assess for any evidence of clots in your chest. Your V/Q scan results were 

normal. Your imaging showed some evidence of pulmonary artery dilation, which 

suggests increased pressure in that blood vessel. Your heart showed some 

evidence of dysfunction in its ability to relax. There were no concerning 

findings of your legs. Laboratory testing showed no evidence of damage to your 

heart nor signs of inflammation. While here, you were seen by a heart specialist

and a lung specialist. Suspicion for pericarditis or inflammation of the lining 

around your heart is low. It is suspected that your Lupus may be contributing to

your feelings of chest discomfort. You are stable and may return home.





Additional imaging findings:


During imaging of your chest, there was an incidental finding of an enlarged 

thyroid. Please follow up with your primary care provider for further care of 

this finding. 





Medication:


Please continue to take your home medications as prescribed:


   -Amitriptyline HCl (Elavil) 150mg by mouth at bedtime for nerve pain.


   -Carbamazepine 200mg by mouth every 12 hours and Topiramate (Topamax) 50mg by

mouth twice a day for your seizure disorder.


   -Hydroxychloroquine Sulfate (Plaquenil) 200mg by mouth and Prednisone 5mg by 

mouth twice a day for your Lupus.





- You may take acetaminophen 650mg every 6 hours as needed for your discomfort.





Follow up:


-Please make and appointment with your primary care provider, Dr. Reyes, in 2 

weeks. You may discuss your hospitalization. You will need to follow up with a 

thyroid ultrasound with your doctor as well.


-Please make and appointment with your rheumatologist in 1 week. If you do not 

have one, a referal has been provided to Dr. Dove for management and 

medication optimization for your Lupus.


-Please make and appointment with your cardiologist, Dr. Scott, in 1 week. 

You may discuss findings of your heart during this visit. You may need a chest 

MRI for further investigation of your symptoms.


-Please make and appointment with your pulmonologist, Dr. Garza, in 1 week.

You can schedule for pulmonary function test and sleep study.





Additional information:


-You are being discharged to your home.


-Please return to the Emergency Department if you experience worsening pain, 

fevers, chills, shortness of breath, or chest pain, or if you experience any 

worsening, new or concerning symptoms. 


Referrals: 


Pan Dove MD [Staff Physician] - 1 Week


Anupam Scott MD [Staff Physician] - 1 Week


Wan Reyes [Primary Care Provider] - 2 Weeks


Jin Garza MD [Staff Physician] - 


Disposition: HOME





- Home Medications


Comprehensive Discharge Medication List: 


Ambulatory Orders





Acetaminophen [Tylenol .Regular Strength -] 650 mg PO Q6H PRN #60 tablet 

10/15/20 


Amitriptyline HCl [Elavil -] 150 mg PO HS 10/15/20 


Carbamazepine [Carbamazepine ER] 200 mg PO Q12H 10/15/20 


Hydroxychloroquine Sulfate [Plaquenil] 200 mg PO DAILY 10/15/20 


Prednisone 5 mg PO BID 10/15/20 


Topiramate [Topamax] 50 mg PO BID 10/15/20 








This patient is new to me today: No


Emergency Visit: Yes


ED Registration Date: 10/12/20


Care time: The patient presented to the Emergency Department on the above date 

and was hospitalized for further evaluation of their emergent condition.


Critical Care patient: No





- Discharge Referral


Referred to [facility] No





ATTENDING PHYSICIAN STATEMENT





I saw and evaluated the patient.


I reviewed the resident's note and discussed the case with the resident.


I agree with the resident's findings and plan as documented.








SUBJECTIVE:








OBJECTIVE:








ASSESSMENT AND PLAN:

## 2020-10-15 NOTE — PN
Progress Note (short form)





- Note


Progress Note: 


Resting in NAD on RA. 


Feels about the same. 


No acute events overnight. 





                                 Intake & Output











 10/12/20 10/13/20 10/14/20 10/15/20





 23:59 23:59 23:59 23:59


 


Intake Total 1100 900 670 250


 


Balance 1100 900 670 250


 


Weight 229 lb 3.2 oz   








                                Last Vital Signs











Temp Pulse Resp BP Pulse Ox


 


 98.2 F   88   22 H  125/7 L  95 


 


 10/15/20 10:00  10/15/20 10:00  10/15/20 10:00  10/15/20 10:00  10/15/20 10:00








Active Medications





Acetaminophen (Tylenol -)  650 mg PO Q6H PRN


   PRN Reason: PAIN LEVEL 6-10


Carbamazepine (Tegretol -)  200 mg PO BID ADRIANNA


   Last Admin: 10/15/20 09:34 Dose:  200 mg


   Documented by: 














Constitutional: Yes: Obese, NAD 


Eyes: Yes: WNL


HENT: Yes: WNL


Neck: Yes: WNL


Cardiovascular: Yes: Regular Rate and Rhythm, S1, S2


Respiratory: Yes: CTA Bilaterally


Gastrointestinal: Yes: Normal Bowel Sounds, Soft


Extremities: Yes: WNL


Edema: No


Labs: 


                         Laboratory Results - last 24 hr











  10/14/20 10/15/20 10/15/20





  06:35 05:50 05:50


 


WBC   4.8 


 


RBC   3.90 


 


Hgb   12.5 


 


Hct   36.0 


 


MCV   92.4 


 


MCH   32.0 


 


MCHC   34.6 


 


RDW   13.4 


 


Plt Count   249 


 


MPV   7.8 


 


PTT (Actin FS)    33.6


 


Sodium   


 


Potassium   


 


Chloride   


 


Carbon Dioxide   


 


Anion Gap   


 


BUN   


 


Creatinine   


 


Est GFR (CKD-EPI)AfAm   


 


Est GFR (CKD-EPI)NonAf   


 


Random Glucose   


 


Calcium   


 


Magnesium   


 


Total T3  108.00  














  10/15/20





  05:50


 


WBC 


 


RBC 


 


Hgb 


 


Hct 


 


MCV 


 


MCH 


 


MCHC 


 


RDW 


 


Plt Count 


 


MPV 


 


PTT (Actin FS) 


 


Sodium  143


 


Potassium  3.9


 


Chloride  112 H


 


Carbon Dioxide  25


 


Anion Gap  6 L


 


BUN  8.8


 


Creatinine  0.6


 


Est GFR (CKD-EPI)AfAm  134.01


 


Est GFR (CKD-EPI)NonAf  115.63


 


Random Glucose  96


 


Calcium  8.7


 


Magnesium  2.0


 


Total T3 

















Problem List





- Problems


(1) HTN (hypertension)


Code(s): I10 - ESSENTIAL (PRIMARY) HYPERTENSION   





(2) Chest pain


Code(s): R07.9 - CHEST PAIN, UNSPECIFIED   


Qualifiers: 


   Chest pain type: chest pain on breathing   Qualified Code(s): R07.1 - Chest 

pain on breathing; R07.81 - Pleurodynia   





(3) Shortness of breath


Code(s): R06.02 - SHORTNESS OF BREATH   





(4) SLE (systemic lupus erythematosus)


Code(s): M32.9 - SYSTEMIC LUPUS ERYTHEMATOSUS, UNSPECIFIED   





(5) H/O deep venous thrombosis


Code(s): Z86.718 - PERSONAL HISTORY OF OTHER VENOUS THROMBOSIS AND EMBOLISM   





Assessment/Plan


IMP NORMAL V/Q IMAGING WITHOUT EVIDENCE OF VTE 


      H/O SLE


      H/O DVT UNPROVOKED NEVER WORKED UP FOR HYPER-COAGULABLE STATE


      DIASTOLIC DYSFUNCTION


      ASTHMA


      SEIZURE DISORDER


      HTN


      H/O GASTRIC BYPASS


      OBESITY


      SANCHEZ LIKELY DUE TO PAH DUE TO SLE 








PLAN: 


ANALGESICS PRN 


NO SMOKING 


OUTPATIENT PFTS 


WILL NEED REPEAT OUTPATIENT NPSG 


DC PLANNING 





DR BAL

## 2020-10-15 NOTE — EKG
Test Reason : 

Blood Pressure : ***/*** mmHG

Vent. Rate : 106 BPM     Atrial Rate : 106 BPM

   P-R Int : 178 ms          QRS Dur : 096 ms

    QT Int : 352 ms       P-R-T Axes : 060 -09 054 degrees

   QTc Int : 467 ms

 

SINUS TACHYCARDIA

SEPTAL INFARCT (CITED ON OR BEFORE 25-AUG-2018)

ABNORMAL ECG

WHEN COMPARED WITH ECG OF 15-OCT-2020 13:25,

QUESTIONABLE CHANGE IN INITIAL FORCES OF SEPTAL LEADS

Confirmed by EDWIN BELTRE, PUNEET ([id number]) on 10/15/2020 4:14:07 PM

 

Referred By:             Confirmed By:PUNEET PINEDA MD

## 2020-10-15 NOTE — EKG
Test Reason : 

Blood Pressure : ***/*** mmHG

Vent. Rate : 095 BPM     Atrial Rate : 095 BPM

   P-R Int : 178 ms          QRS Dur : 094 ms

    QT Int : 358 ms       P-R-T Axes : 051 -16 052 degrees

   QTc Int : 449 ms

 

NORMAL SINUS RHYTHM

CANNOT RULE OUT ANTEROSEPTAL INFARCT (CITED ON OR BEFORE 25-AUG-2018)

ABNORMAL ECG

WHEN COMPARED WITH ECG OF 12-OCT-2020 14:57,

QUESTIONABLE CHANGE IN QRS AXIS

T WAVE INVERSION NO LONGER EVIDENT IN INFERIOR LEADS

NONSPECIFIC T WAVE ABNORMALITY NOW EVIDENT IN LATERAL LEADS

Confirmed by PUNEET PINEDA MD ([id number]) on 10/15/2020 1:34:19 PM

 

Referred By:             Confirmed By:PUNEET PINEDA MD

## 2020-10-16 LAB
DEPRECATED RDW RBC AUTO: 13.3 % (ref 11.6–15.6)
HCT VFR BLD CALC: 34.7 % (ref 32.4–45.2)
HGB BLD-MCNC: 12.2 GM/DL (ref 10.7–15.3)
MCH RBC QN AUTO: 32.1 PG (ref 25.7–33.7)
MCHC RBC AUTO-ENTMCNC: 35 G/DL (ref 32–36)
MCV RBC: 91.6 FL (ref 80–96)
PLATELET # BLD AUTO: 243 K/MM3 (ref 134–434)
PMV BLD: 8 FL (ref 7.5–11.1)
RBC # BLD AUTO: 3.79 M/MM3 (ref 3.6–5.2)
WBC # BLD AUTO: 6.6 K/MM3 (ref 4–10)

## 2020-10-16 RX ADMIN — HYDROXYCHLOROQUINE SULFATE SCH MG: 200 TABLET, FILM COATED ORAL at 10:01

## 2020-10-16 RX ADMIN — HEPARIN SODIUM SCH UNIT: 5000 INJECTION, SOLUTION INTRAVENOUS; SUBCUTANEOUS at 21:08

## 2020-10-16 NOTE — PN
Progress Note, Physician


History of Present Illness: 








Ms. Zuluaga is a 41 y/o black female with a PMH of SLE and RA (on Paquenil and 

Prednisone), HTN, seizures, DVT 10 years ago, morbid obesity (s/p gastric bypass

surgery 2019; weighed nearly 300 lbs at the time); anxiety; elevated 

triglycerides, ?hypothyroidism, now presenting to the ED because of 4 days of 

chest pain. The pain started abruptly; denies any inciting incident. It is 

constant, sharp, left sided, and occasionally radiates to her back. The pain is 

worse with deep breaths. She is also having increased SOB, worse with exertion 

and is also complaining of lightheadedness. She denies any LOC. She has 

experienced similar pain before (beginning about a year ago), but never this b

ad. She denies any associated N/V, abdominal pain, headache, blurry vision, or 

pain in her calves. Admits to DVT over 10 years ago, and she was anticoagulated 

with Coumadin. No longer on anticoagulants. Patient reports IV contrast allergy;

it makes her SOB. 





Pt says she walks regularly, including up and down stairs, but has been 

increasingly dyspneic for the past week.





She denies recent travel, sick contacts, hx of malignancy, cough, or hemoptysis.










- Current Medication List


Current Medications: 


Active Medications





Acetaminophen (Tylenol -)  650 mg PO Q6H PRN


   PRN Reason: PAIN LEVEL 6-10


Carbamazepine (Tegretol -)  200 mg PO DAILY ADRIANNA


Hydroxychloroquine Sulfate (Plaquenil -)  200 mg PO DAILY ADRIANNA


Prednisone 5 mg/ Prednisone 2 (mg)  7 mg PO DAILY ADRIANNA











- Objective


Vital Signs: 


                                   Vital Signs











Temperature  97.5 F L  10/16/20 01:44


 


Pulse Rate  789 H  10/16/20 05:00


 


Respiratory Rate  18   10/16/20 05:00


 


Blood Pressure  102/55 L  10/16/20 05:00


 


O2 Sat by Pulse Oximetry (%)  97   10/15/20 18:00











Eyes: Yes: WNL, Conjunctiva Clear, EOM Intact


HENT: Yes: WNL, Atraumatic, Normocephalic


Neck: Yes: WNL, Supple, Trachea Midline


Cardiovascular: Yes: WNL, Regular Rate and Rhythm


Respiratory: Yes: WNL, Regular, CTA Bilaterally


Gastrointestinal: Yes: WNL, Normal Bowel Sounds


Genitourinary: Yes: WNL


Musculoskeletal: Yes: WNL


Extremities: Yes: WNL


Edema: No


Integumentary: Yes: WNL


Neurological: Yes: WNL, Alert, Oriented


...Motor Strength: WNL


Psychiatric: Yes: WNL


Labs: 


                                    CBC, BMP





                                 10/16/20 05:40 





                                 10/15/20 05:50 





                                    INR, PTT











INR  1.10  (0.83-1.09)  H  10/13/20  06:09    


 


Fibrinogen  284.0 mg/dL (238-498)   10/12/20  20:50    














Assessment/Plan








Atypical chest pain for the past year; elicited by deep breaths. 


SLE; RA


HTN


morbid obesity; s/p gastric bypass surgery


hypertriglyceridemia


seizures


?thyroid dysfunction


DVT 2010; not on anticoagulants after a few months of warfarin at that time


EKG NSR; septal infarct (noted since at least 2016)


ECHO: normal LVEF; abnormal diastolic compliance; no significant valvular 

disease.


TNI < 0.02 x 2; CK WNL.


CT chest: no aortic aneurysm; unable to assess for aortic dissection 

(noncontrast; pt allergic to contrast); diffuse thyroid enlargement





Rec:


Repeat EKG


Chest MRI to r/o aortic dissection


f/u with PMD, rheumatologist (on Plaquenil, Prednisone)


f/u thyroid findings


Stress treadmill ECHO.

## 2020-10-16 NOTE — PN
Teaching Attending Note


Name of Resident: Remi Parrish





ATTENDING PHYSICIAN STATEMENT





I saw and evaluated the patient.


I reviewed the resident's note and discussed the case with the resident.


I agree with the resident's findings and plan as documented.








SUBJECTIVE:


seen in am around 10 am 


no fever or chills. cont to have pain in mid sternal area. constant fo the past 

few days . worse when she laughs and when she takes deep breath . 


 


OBJECTIVE:


NAD , awake, alert, cooeprative 


CV: RRR


Lungs: CTAB 


Ext : No edema or erythema on upper or lower extremities. radial pulse 2+ b/l. 








ASSESSMENT AND PLAN:








41 y/o lady with h/o SLE, DVt , Gastric bypass, seizure, asthma, fibromyalgia, 

and HTn who presented with cp . 





1- CP: unclear etiology at this time. unlikely cardiac. CT of chest reviewed. 

echo did not show increased R sided pressure. EKG reviewed. 


- MRI of chest without anitra was done which is not diagnostic 


-  MRA with anitra to be performed. to r/o aortic dissection  . team d.w IR 

attending 


- stress test to be done after if  negative 


- if above w/u negative, might need GI f.u for manometry to r/o esophageal spasm







2- Abnormal TFTs. initial pattern suggest secondary hypothyroidism, but repeat 

this am , indicate abnormal tests as a results of acute illness/lab variation . 

CT with enlarged thyroid 


- Thyroid US as out pt 


- repeat TFTS as out pt


- f/u with endocrine 





3- h/o DVT: 


- she needs to follow up with heme as out pt for further w/u  . she was not told

inpast by her hematologist she had any hypercoagulable state that indicated long

term AC. 


- refer to heme 








dispo : HLOC  








SLE, DVT 10 years ago (was on Coumadin), gastric bypass 2019, seizure disorder, 

asthma, HTN, obesity, fibromyalgia, presents with sudden onset SOB and L sided 

CP:

## 2020-10-16 NOTE — PN
Physical Exam: 


SUBJECTIVE: Patient seen and examined. No acute events overnight. Pt complains 

of persistent chest discomfort with deep breath. 








OBJECTIVE:





                                   Vital Signs











 Period  Temp  Pulse  Resp  BP Sys/Sepulveda  Pulse Ox


 


 Last 24 Hr  97.5 F-98.2 F    18-22  /50-76  97-97











GENERAL: Awake, alert. Not in acute distress.


HEENT: NCAT, EOMI, PERRL. Moist mucus membranes.


CARDIAC: RRR, S1, S2 present. No murmurs. No pericardial rub.


PULMONARY: CTA b/l. No wheezes or accessory muscle use.


ABDOMEN: Obese, nontender to palpation, nondistended. Normoactive bowel sounds


EXTREMITIES: Warm, well-perfused. No edema. 2+ pulses b/l.


SKIN: Tattoos on skin. Warm, dry.














                             Laboratory Last Values











WBC  6.6 K/mm3 (4.0-10.0)   10/16/20  05:40    


 


RBC  3.79 M/mm3 (3.60-5.2)   10/16/20  05:40    


 


Hgb  12.2 GM/dL (10.7-15.3)   10/16/20  05:40    


 


Hct  34.7 % (32.4-45.2)   10/16/20  05:40    


 


MCV  91.6 fl (80-96)   10/16/20  05:40    


 


MCH  32.1 pg (25.7-33.7)   10/16/20  05:40    


 


MCHC  35.0 g/dl (32.0-36.0)   10/16/20  05:40    


 


RDW  13.3 % (11.6-15.6)   10/16/20  05:40    


 


Plt Count  243 K/MM3 (134-434)   10/16/20  05:40    


 


MPV  8.0 fl (7.5-11.1)   10/16/20  05:40    


 


Absolute Neuts (auto)  2.4 K/mm3 (1.5-8.0)   10/13/20  06:09    


 


Neutrophils %  42.7 % (42.8-82.8)  L D 10/13/20  06:09    


 


Lymphocytes %  46.8 % (8-40)  H D 10/13/20  06:09    


 


Monocytes %  6.8 % (3.8-10.2)   10/13/20  06:09    


 


Eosinophils %  3.4 % (0-4.5)   10/13/20  06:09    


 


Basophils %  0.3 % (0-2.0)   10/13/20  06:09    


 


Nucleated RBC %  0 % (0-0)   10/13/20  06:09    


 


ESR  2 mm/hr (0-20)   10/13/20  13:00    


 


PT with INR  12.90 SEC (9.7-13.0)   10/13/20  06:09    


 


INR  1.10  (0.83-1.09)  H  10/13/20  06:09    


 


PTT (Actin FS)  29.1 SECONDS (25.2-36.5)   10/16/20  05:40    


 


Fibrinogen  284.0 mg/dL (238-498)   10/12/20  20:50    


 


D-Dimer  474 ng/ml (0-500)   10/12/20  19:50    


 


Sodium  143 mmol/L (136-145)   10/15/20  05:50    


 


Potassium  3.9 mmol/L (3.5-5.1)   10/15/20  05:50    


 


Chloride  112 mmol/L ()  H  10/15/20  05:50    


 


Carbon Dioxide  25 mmol/L (21-32)   10/15/20  05:50    


 


Anion Gap  6 MMOL/L (8-16)  L  10/15/20  05:50    


 


BUN  8.8 mg/dL (7-18)   10/15/20  05:50    


 


Creatinine  0.6 mg/dL (0.55-1.3)   10/15/20  05:50    


 


Est GFR (CKD-EPI)AfAm  134.01   10/15/20  05:50    


 


Est GFR (CKD-EPI)NonAf  115.63   10/15/20  05:50    


 


POC Glucometer  144 UNITS ()   10/15/20  20:59    


 


Random Glucose  96 mg/dL ()   10/15/20  05:50    


 


Hemoglobin A1c %  5.0 % (4.2-6.3)   10/13/20  06:09    


 


Calcium  8.7 mg/dL (8.5-10.1)   10/15/20  05:50    


 


Magnesium  2.0 mg/dL (1.8-2.4)   10/15/20  05:50    


 


Total Bilirubin  0.2 mg/dL (0.2-1)   10/13/20  06:09    


 


AST  14 U/L (15-37)  L  10/13/20  06:09    


 


ALT  38 U/L (13-61)   10/13/20  06:09    


 


Alkaline Phosphatase  67 U/L ()   10/13/20  06:09    


 


Creatine Kinase  88 U/L ()   10/12/20  15:20    


 


Troponin I  < 0.02 ng/ml (0.00-0.05)   10/12/20  20:50    


 


C-Reactive Protein  < 0.3 MG/DL (0.00-0.3)   10/14/20  06:35    


 


Total Protein  6.2 g/dl (6.4-8.2)  L  10/13/20  06:09    


 


Albumin  3.3 g/dl (3.4-5.0)  L  10/13/20  06:09    


 


Triglycerides  227 mg/dL (0-150)  H  10/13/20  06:09    


 


Cholesterol  169 mg/dL ()   10/13/20  06:09    


 


Total LDL Cholesterol  81 mg/dL (5-100)   10/13/20  06:09    


 


HDL Cholesterol  68 mg/dL (40-60)  H  10/13/20  06:09    


 


TSH  0.45 uIU/ml (0.358-3.74)   10/16/20  05:40    


 


Free T4  0.66 ng/dl (0.76-1.46)  L  10/16/20  05:40    


 


Free T3  2.3 pg/ml (2.0-4.4)   10/13/20  14:58    


 


Total T3  108.00 ng/dl ()   10/14/20  06:35    


 


Resin T3 Uptake  30.2 % (30-39)   10/13/20  06:09    


 


Urine Color  Dk yellow   10/12/20  15:20    


 


Urine Appearance  Clear   10/12/20  15:20    


 


Urine pH  >= 9.0  (5.0-8.0)  H  10/12/20  15:20    


 


Ur Specific Gravity  1.024  (1.010-1.035)   10/12/20  15:20    


 


Urine Protein  1+  (NEGATIVE)  H  10/12/20  15:20    


 


Urine Glucose (UA)  Negative  (NEGATIVE)   10/12/20  15:20    


 


Urine Ketones  Negative  (NEGATIVE)   10/12/20  15:20    


 


Urine Blood  Negative  (NEGATIVE)   10/12/20  15:20    


 


Urine Nitrite  Negative  (NEGATIVE)   10/12/20  15:20    


 


Urine Bilirubin  Negative  (NEGATIVE)   10/12/20  15:20    


 


Urine Urobilinogen  0.2 mg/dL (0.2-1.0)   10/12/20  15:20    


 


Ur Leukocyte Esterase  Negative  (NEGATIVE)   10/12/20  15:20    


 


Urine WBC (Auto)  69.6 /uL (0-25.8)   10/12/20  15:20    


 


Urine RBC (Auto)  42 /uL (0-23.9)   10/12/20  15:20    


 


Urine Casts (Auto)  1 /uL (0-3.1)   10/12/20  15:20    


 


U Epithel Cells (Auto)  30 /uL (0-25.1)   10/12/20  15:20    


 


Urine Bacteria (Auto)  103 /uL (0-1359)   10/12/20  15:20    


 


Urine HCG, Qual  Negative   10/12/20  15:20    


 


Opiates Screen  Negative ng/ml (ZUEIIB=065)   10/12/20  22:10    


 


Methadone Screen  Negative ng/ml (CVJADI=767)   10/12/20  22:10    


 


Barbiturate Screen  Negative ng/ml (DSGVHG=278)   10/12/20  22:10    


 


Phencyclidine Screen  Negative ng/ml (CUTOFF=25)   10/12/20  22:10    


 


Ur Amphetamines Screen  Negative ng/ml (HAMNLK=270)   10/12/20  22:10    


 


MDMA (Ecstasy) Screen  Negative ng/ml (TYFKBI=673)   10/12/20  22:10    


 


Benzodiazepines Screen  Negative ng/ml (MCMXWW=867)   10/12/20  22:10    


 


Cocaine Screen  Negative ng/ml (PTNJUS=246)   10/12/20  22:10    


 


U Marijuana (THC) Screen  Negative ng/ml (CUTOFF=50)   10/12/20  22:10    


 


COVID-19 ([facility])  Not detected  (Not Detected)   10/12/20  17:00    














Active Medications











Generic Name Dose Route Start Last Admin





  Trade Name Freq  PRN Reason Stop Dose Admin


 


Acetaminophen  650 mg  10/14/20 16:05 





  Tylenol -  PO  





  Q6H PRN  





  PAIN LEVEL 6-10  


 


Carbamazepine  200 mg  10/16/20 10:00  10/16/20 10:01





  Tegretol -  PO   200 mg





  DAILY ADRIANNA   Administration


 


Hydroxychloroquine Sulfate  200 mg  10/16/20 10:00  10/16/20 10:01





  Plaquenil -  PO   200 mg





  DAILY ADRIANNA   Administration


 


Prednisone 5 mg/ Prednisone 2  7 mg  10/16/20 10:00  10/16/20 10:03





  mg  PO   7 mg





  DAILY ADRIANNA   Administration








CT chest 10/12


No aortic aneurysm is noted. Evaluation for possible aortic dissection cannot be

performed on this exam due to lack of intravascular contrast. Additional 

evaluation utilizing noncontrast MRI/MRA may be considered versus CT angiography

with utilization of a premedication protocol. The main pulmonary artery is 

somewhat dilated with a 3.3 cm diameter suggestive of increased pulmonary 

arterial pressure. Follow-up evaluation is suggested. No infiltrate or pleural 

effusion is seen. There appears to be diffuse enlargement of the partially 

imaged thyroid gland. Clinical/laboratory correlation is suggested as well as 

correlation with sonography. 





Duplex lower extremities 10/12


There is no sonographic evidence of deep vein thrombosis. If there is clinical 

concern for possible isolated calf DVT or if there is a clinical diagnosis of 

uncomplicated superficial thrombophlebitis, then correlation with close follow 

up sonography is sug gested. 





Echocardiogram 10/13


LV size, thickness and function are normal, EF 72%. Grade I diastolic 

dysfunction (abnormal relaxation pattern). RV is normal in size and function. 

Normal L and R atrial size and function. MV normal, AV and PV normal in 

structure and function





V/Q scan 10/14


There is normal ventilation and perfusion to the right and left lungs with no 

evidence of mismatched perfusion defects. 





CXR 10/14


2 views of the chest were submitted. There are clear well aerated lungs, normal 

mediastinum and sharp angles. The soft tissues are intact. Acute process is not 

seen. There is a new ringlike density projects over the peripheral left chest 

which was not present on 10/12/2020 and most likely is artifactual. The bones 

are intact. Correlation recommended. 





Chest MRI w/o contrast 10/16


No MRI evidence of thoracic aortic aneurysm. No dissection seen in the mid to 

distal arch and descending thoracic aorta. Evaluation of the ascending aorta and

proximal aortic arch is partially limited due to pulsation artifacts however no 

gross dissection seen. Trace fluid around the proximal aortic arch likely fluid 

in the high pericardial recess.





ASSESSMENT/PLAN:


39 yo F with PMH HTN, SLE, DVT (10 years ago, was treated with coumadin), 

gastric bypass in 2019, seizure disorder, obesity presented to ED with SOB and L

sided chest pain 4 days ago. Pt is currently admitted for suspicion of pulmonary

embolism.





Shortness of breath, etiology to be determined. Pleuritis secondary to viral 

infection vs. costochondritis vs. Unlikely PE vs. Pericarditis vs. esophageal 

spasm


-Given history of SLE, DVT and obesity, pt has risks for PE. Negative V/Q scan 

and echo for R heart strain makes PE unlikely


-CTA not done due to allergies to contrast


-CT chest as above, evidence of dilated main pulmonary artery, suggestive of INC

pulmonary arterial pressure.


-Echocardiogram and duplex lower extremities as above


-EKG without significant change from previous


- V/Q scan normal


- ESR and CRP normal


-Supplemental O2 to keep SpO2 >90%


-Pulmonary consulted.


-cardiology consulted.


-MRI chest w/o contrast as above. 


-f/u Chest MRI with contrast r/o aortic dissection. Will proceed with stress 

echo after aortic dissection has been ruled out. Will be made NPO prior to 

stress test.


-May need f/u esophageal manometry to r/o esophageal dysmotility





Thyromegaly


-As per CT above


-Repeated TFTs. Normal TSH and low free T4


-Will need outpatient thyroid US.


-Will need f/u with endocrinologist





History of DVT


-Will require outpatient heme/onc for further workup


-duplex lower extremities as above





Seizure disorder, chronic


-c/w home dose carbamazepine 200mg qD





SLE, chronic


-c/w plaquenil 200mg qD, prednisone 7mg





Ppx


-DVT: lovenox





FEN


-no standing fluids


-replete lytes as needed


-NPO after midnight for stress test





COVID negative 10/12





Dispo: continue to monitor on tele. MRI chest with contrast tomorrow, after r/o 

aortic dissection, stress echo.








Visit type





- Emergency Visit


Emergency Visit: Yes


ED Registration Date: 10/12/20


Care time: The patient presented to the Emergency Department on the above date 

and was hospitalized for further evaluation of their emergent condition.





- New Patient


This patient is new to me today: No





- Critical Care


Critical Care patient: No





ATTENDING PHYSICIAN STATEMENT





I saw and evaluated the patient.


I reviewed the resident's note and discussed the case with the resident.


I agree with the resident's findings and plan as documented.








SUBJECTIVE:








OBJECTIVE:








ASSESSMENT AND PLAN:

## 2020-10-17 RX ADMIN — HEPARIN SODIUM SCH UNIT: 5000 INJECTION, SOLUTION INTRAVENOUS; SUBCUTANEOUS at 06:12

## 2020-10-17 RX ADMIN — HYDROXYCHLOROQUINE SULFATE SCH MG: 200 TABLET, FILM COATED ORAL at 09:59

## 2020-10-17 RX ADMIN — ACETAMINOPHEN PRN MG: 325 TABLET ORAL at 21:39

## 2020-10-17 RX ADMIN — HEPARIN SODIUM SCH UNIT: 5000 INJECTION, SOLUTION INTRAVENOUS; SUBCUTANEOUS at 14:15

## 2020-10-17 RX ADMIN — HEPARIN SODIUM SCH UNIT: 5000 INJECTION, SOLUTION INTRAVENOUS; SUBCUTANEOUS at 21:34

## 2020-10-17 NOTE — PN
Progress Note, Physician


History of Present Illness: 








Ms. Zuluaga is a 39 y/o black female with a PMH of SLE and RA (on Paquenil and 

Prednisone), HTN, seizures, DVT 10 years ago, morbid obesity (s/p gastric bypass

surgery 2019; weighed nearly 300 lbs at the time); anxiety; elevated 

triglycerides, ?hypothyroidism, now presenting to the ED because of 4 days of 

chest pain. The pain started abruptly; denies any inciting incident. It is 

constant, sharp, left sided, and occasionally radiates to her back. The pain is 

worse with deep breaths. She is also having increased SOB, worse with exertion 

and is also complaining of lightheadedness. She denies any LOC. She has 

experienced similar pain before (beginning about a year ago), but never this b

ad. She denies any associated N/V, abdominal pain, headache, blurry vision, or 

pain in her calves. Admits to DVT over 10 years ago, and she was anticoagulated 

with Coumadin. No longer on anticoagulants. Patient reports IV contrast allergy;

it makes her SOB. 





Pt says she walks regularly, including up and down stairs, but has been 

increasingly dyspneic for the past week.





She denies recent travel, sick contacts, hx of malignancy, cough, or hemoptysis.










- Current Medication List


Current Medications: 


Active Medications





Acetaminophen (Tylenol -)  650 mg PO Q6H PRN


   PRN Reason: PAIN LEVEL 6-10


Carbamazepine (Tegretol -)  200 mg PO DAILY [facility]


   Last Admin: 10/17/20 10:00 Dose:  200 mg


   Documented by: 


Heparin Sodium (Porcine) (Heparin -)  5,000 unit SQ TID [facility]


   Last Admin: 10/17/20 14:15 Dose:  5,000 unit


   Documented by: 


Hydroxychloroquine Sulfate (Plaquenil -)  200 mg PO DAILY [facility]


   Last Admin: 10/17/20 09:59 Dose:  200 mg


   Documented by: 


Prednisone 5 mg/ Prednisone 2 (mg)  7 mg PO DAILY [facility]


   Last Admin: 10/17/20 09:59 Dose:  7 mg


   Documented by: 











- Objective


Vital Signs: 


                                   Vital Signs











Temperature  98 F   10/17/20 14:00


 


Pulse Rate  106 H  10/17/20 14:00


 


Respiratory Rate  20   10/17/20 14:00


 


Blood Pressure  138/70   10/17/20 14:00


 


O2 Sat by Pulse Oximetry (%)  98   10/17/20 10:00











Eyes: Yes: WNL, Conjunctiva Clear, EOM Intact


HENT: Yes: WNL, Atraumatic, Normocephalic


Neck: Yes: WNL, Supple, Trachea Midline


Cardiovascular: Yes: WNL, Regular Rate and Rhythm


Respiratory: Yes: WNL, Regular, CTA Bilaterally


Gastrointestinal: Yes: WNL, Normal Bowel Sounds


Genitourinary: Yes: WNL


Musculoskeletal: Yes: WNL


Extremities: Yes: WNL


Edema: No


Integumentary: Yes: WNL


Neurological: Yes: WNL, Alert, Oriented


...Motor Strength: WNL


Psychiatric: Yes: WNL


Labs: 


                                    CBC, BMP





                                 10/16/20 05:40 





                                 10/15/20 05:50 





                                    INR, PTT











INR  1.10  (0.83-1.09)  H  10/13/20  06:09    


 


Fibrinogen  284.0 mg/dL (238-498)   10/12/20  20:50    














Assessment/Plan








Atypical chest pain for the past year; elicited by deep breaths. 


SLE; RA


HTN


morbid obesity; s/p gastric bypass surgery


hypertriglyceridemia


seizures


?thyroid dysfunction


DVT 2010; not on anticoagulants after a few months of warfarin at that time


EKG NSR; septal infarct (noted since at least 2016)


ECHO: normal LVEF; abnormal diastolic compliance; no significant valvular diseas

e.


TNI < 0.02 x 2; CK WNL.


CT chest: no aortic aneurysm; unable to assess for aortic dissection (non

contrast; pt allergic to contrast); diffuse thyroid enlargement





Rec:


Repeat EKG


Chest MRI negative for aortic dissection


f/u with PMD, rheumatologist (on Plaquenil, Prednisone)


f/u thyroid findings


Stress treadmill ECHO.

## 2020-10-17 NOTE — PN
Teaching Attending Note


Name of Resident: Fiona Manjarrez





ATTENDING PHYSICIAN STATEMENT





I saw and evaluated the patient.


I reviewed the resident's note and discussed the case with the resident.


I agree with the resident's findings and plan as documented.








SUBJECTIVE:





light headed with standing . cont to have cp . 


no SOB 





OBJECTIVE:


NAD , awake, alert, flat affect 


CV: RRR


Lungs: CTAB 


Ext : No edema or erythema on upper or lower extremities.  








ASSESSMENT AND PLAN:








41 y/o lady with h/o SLE, DVt , Gastric bypass, seizure, asthma, fibromyalgia, 

and HTN who presented with cp . 





1- CP: unclear etiology at this time. 


MRi with anitra with no dissection 


possible fluid in pericardial recess on MRI, echo earlier this admission with no

pericardial effusion . Not sure of significance. will d/w card 


stress test pending 


check orthostatic VS 





2- Abnormal TFTs. need reepat as out pt and endo f/u 





3- h/o DVT: 


- she needs to follow up with heme as out pt for further w/u  . 








dispo : [facility]

## 2020-10-17 NOTE — PN
Physical Exam: 


SUBJECTIVE: Patient seen and examined. No acute events overnight. No acute tele 

events overnight. Pt stated she feels the same. Had MRI done with and without 

contrast.








OBJECTIVE:





                                   Vital Signs











 Period  Temp  Pulse  Resp  BP Sys/Sepulveda  Pulse Ox


 


 Last 24 Hr  97.8 F-98 F    17-20  102-138/59-91  











GENERAL: Awake, alert. Not in acute distress.


HEENT: NCAT, EOMI, PERRL. Moist mucus membranes.


CARDIAC: RRR, S1, S2 present. No murmurs. No pericardial rub.


PULMONARY: CTA b/l. No wheezes or accessory muscle use.


ABDOMEN: Obese, nontender to palpation, nondistended. Normoactive bowel sounds


EXTREMITIES: Warm, well-perfused. No edema. 2+ pulses b/l.


SKIN: Tattoos on skin. Warm, dry.








                             Laboratory Last Values











WBC  6.6 K/mm3 (4.0-10.0)   10/16/20  05:40    


 


RBC  3.79 M/mm3 (3.60-5.2)   10/16/20  05:40    


 


Hgb  12.2 GM/dL (10.7-15.3)   10/16/20  05:40    


 


Hct  34.7 % (32.4-45.2)   10/16/20  05:40    


 


MCV  91.6 fl (80-96)   10/16/20  05:40    


 


MCH  32.1 pg (25.7-33.7)   10/16/20  05:40    


 


MCHC  35.0 g/dl (32.0-36.0)   10/16/20  05:40    


 


RDW  13.3 % (11.6-15.6)   10/16/20  05:40    


 


Plt Count  243 K/MM3 (134-434)   10/16/20  05:40    


 


MPV  8.0 fl (7.5-11.1)   10/16/20  05:40    


 


Absolute Neuts (auto)  2.4 K/mm3 (1.5-8.0)   10/13/20  06:09    


 


Neutrophils %  42.7 % (42.8-82.8)  L D 10/13/20  06:09    


 


Lymphocytes %  46.8 % (8-40)  H D 10/13/20  06:09    


 


Monocytes %  6.8 % (3.8-10.2)   10/13/20  06:09    


 


Eosinophils %  3.4 % (0-4.5)   10/13/20  06:09    


 


Basophils %  0.3 % (0-2.0)   10/13/20  06:09    


 


Nucleated RBC %  0 % (0-0)   10/13/20  06:09    


 


ESR  2 mm/hr (0-20)   10/13/20  13:00    


 


PT with INR  12.90 SEC (9.7-13.0)   10/13/20  06:09    


 


INR  1.10  (0.83-1.09)  H  10/13/20  06:09    


 


PTT (Actin FS)  29.1 SECONDS (25.2-36.5)   10/16/20  05:40    


 


Fibrinogen  284.0 mg/dL (238-498)   10/12/20  20:50    


 


D-Dimer  474 ng/ml (0-500)   10/12/20  19:50    


 


Sodium  143 mmol/L (136-145)   10/15/20  05:50    


 


Potassium  3.9 mmol/L (3.5-5.1)   10/15/20  05:50    


 


Chloride  112 mmol/L ()  H  10/15/20  05:50    


 


Carbon Dioxide  25 mmol/L (21-32)   10/15/20  05:50    


 


Anion Gap  6 MMOL/L (8-16)  L  10/15/20  05:50    


 


BUN  8.8 mg/dL (7-18)   10/15/20  05:50    


 


Creatinine  0.6 mg/dL (0.55-1.3)   10/15/20  05:50    


 


Est GFR (CKD-EPI)AfAm  134.01   10/15/20  05:50    


 


Est GFR (CKD-EPI)NonAf  115.63   10/15/20  05:50    


 


POC Glucometer  144 UNITS ()   10/15/20  20:59    


 


Random Glucose  96 mg/dL ()   10/15/20  05:50    


 


Hemoglobin A1c %  5.0 % (4.2-6.3)   10/13/20  06:09    


 


Calcium  8.7 mg/dL (8.5-10.1)   10/15/20  05:50    


 


Magnesium  2.0 mg/dL (1.8-2.4)   10/15/20  05:50    


 


Total Bilirubin  0.2 mg/dL (0.2-1)   10/13/20  06:09    


 


AST  14 U/L (15-37)  L  10/13/20  06:09    


 


ALT  38 U/L (13-61)   10/13/20  06:09    


 


Alkaline Phosphatase  67 U/L ()   10/13/20  06:09    


 


Creatine Kinase  88 U/L ()   10/12/20  15:20    


 


Troponin I  < 0.02 ng/ml (0.00-0.05)   10/12/20  20:50    


 


C-Reactive Protein  < 0.3 MG/DL (0.00-0.3)   10/14/20  06:35    


 


Total Protein  6.2 g/dl (6.4-8.2)  L  10/13/20  06:09    


 


Albumin  3.3 g/dl (3.4-5.0)  L  10/13/20  06:09    


 


Triglycerides  227 mg/dL (0-150)  H  10/13/20  06:09    


 


Cholesterol  169 mg/dL ()   10/13/20  06:09    


 


Total LDL Cholesterol  81 mg/dL (5-100)   10/13/20  06:09    


 


HDL Cholesterol  68 mg/dL (40-60)  H  10/13/20  06:09    


 


TSH  0.45 uIU/ml (0.358-3.74)   10/16/20  05:40    


 


Free T4  0.66 ng/dl (0.76-1.46)  L  10/16/20  05:40    


 


Free T3  2.3 pg/ml (2.0-4.4)   10/13/20  14:58    


 


Total T3  108.00 ng/dl ()   10/14/20  06:35    


 


Resin T3 Uptake  30.2 % (30-39)   10/13/20  06:09    


 


Urine Color  Dk yellow   10/12/20  15:20    


 


Urine Appearance  Clear   10/12/20  15:20    


 


Urine pH  >= 9.0  (5.0-8.0)  H  10/12/20  15:20    


 


Ur Specific Gravity  1.024  (1.010-1.035)   10/12/20  15:20    


 


Urine Protein  1+  (NEGATIVE)  H  10/12/20  15:20    


 


Urine Glucose (UA)  Negative  (NEGATIVE)   10/12/20  15:20    


 


Urine Ketones  Negative  (NEGATIVE)   10/12/20  15:20    


 


Urine Blood  Negative  (NEGATIVE)   10/12/20  15:20    


 


Urine Nitrite  Negative  (NEGATIVE)   10/12/20  15:20    


 


Urine Bilirubin  Negative  (NEGATIVE)   10/12/20  15:20    


 


Urine Urobilinogen  0.2 mg/dL (0.2-1.0)   10/12/20  15:20    


 


Ur Leukocyte Esterase  Negative  (NEGATIVE)   10/12/20  15:20    


 


Urine WBC (Auto)  69.6 /uL (0-25.8)   10/12/20  15:20    


 


Urine RBC (Auto)  42 /uL (0-23.9)   10/12/20  15:20    


 


Urine Casts (Auto)  1 /uL (0-3.1)   10/12/20  15:20    


 


U Epithel Cells (Auto)  30 /uL (0-25.1)   10/12/20  15:20    


 


Urine Bacteria (Auto)  103 /uL (0-1359)   10/12/20  15:20    


 


Urine HCG, Qual  Negative   10/12/20  15:20    


 


Opiates Screen  Negative ng/ml (RPGGHS=210)   10/12/20  22:10    


 


Methadone Screen  Negative ng/ml (PQZKCI=053)   10/12/20  22:10    


 


Barbiturate Screen  Negative ng/ml (AMWGIQ=408)   10/12/20  22:10    


 


Phencyclidine Screen  Negative ng/ml (CUTOFF=25)   10/12/20  22:10    


 


Ur Amphetamines Screen  Negative ng/ml (ZPIYHA=563)   10/12/20  22:10    


 


MDMA (Ecstasy) Screen  Negative ng/ml (VGTUFQ=364)   10/12/20  22:10    


 


Benzodiazepines Screen  Negative ng/ml (PEYLSJ=615)   10/12/20  22:10    


 


Cocaine Screen  Negative ng/ml (IJTGEZ=086)   10/12/20  22:10    


 


U Marijuana (THC) Screen  Negative ng/ml (CUTOFF=50)   10/12/20  22:10    


 


COVID-19 ([facility])  Not detected  (Not Detected)   10/12/20  17:00    








Active Medications





Acetaminophen (Tylenol -)  650 mg PO Q6H PRN


   PRN Reason: PAIN LEVEL 6-10


Carbamazepine (Tegretol -)  200 mg PO DAILY [facility]


   Last Admin: 10/17/20 10:00 Dose:  200 mg


   Documented by: 


Heparin Sodium (Porcine) (Heparin -)  5,000 unit SQ TID [facility]


   Last Admin: 10/17/20 14:15 Dose:  5,000 unit


   Documented by: 


Hydroxychloroquine Sulfate (Plaquenil -)  200 mg PO DAILY [facility]


   Last Admin: 10/17/20 09:59 Dose:  200 mg


   Documented by: 


Prednisone 5 mg/ Prednisone 2 (mg)  7 mg PO DAILY [facility]


   Last Admin: 10/17/20 09:59 Dose:  7 mg


   Documented by: 








Chest MRA aortic arch 10/16


Multiplanar imaging was performed before and following the intravenous 

administration of paramagnetic contrast. The current exam is correlated with a 

noncontrast MRI/MRA study performed earlier the same day as well as with a 

noncontrast chest CT study of 3/12/2020. No aortic aneurysm, intramural hematoma

or aortic dissection is noted. The periaortic soft tissue planes appear intact. 

No mediastinal hematoma is seen. The main pulmonary artery is somewhat dilated 

as also visualized on recently performed CT with a 3.3 cm diameter. Impression: 

No aortic dissection is identified. As also noted on recently performed CT the 

main pulmonary artery appears somewhat dilated (3.3 cm diameter) suggestive of 

increased pulmonary arterial pressure. Follow-up evaluation is suggested





Chest MRI w/o contrast 10/16


No MRI evidence of thoracic aortic aneurysm. No dissection seen in the mid to 

distal arch and descending thoracic aorta. Evaluation of the ascending aorta and

proximal aortic arch is partially limited due to pulsation artifacts however no 

gross dissection seen. Trace fluid around the proximal aortic arch likely fluid 

in the high pericardial recess.





ASSESSMENT/PLAN:


39 yo F with PMH HTN, SLE, DVT (10 years ago, was treated with coumadin), 

gastric bypass in 2019, seizure disorder, obesity presented to ED with SOB and L

sided chest pain 4 days ago. Pt is currently admitted for suspicion of pulmonary

embolism.





Shortness of breath, etiology to be determined. Pleuritis secondary to viral 

infection vs. costochondritis vs. Unlikely PE vs. Pericarditis vs. esophageal 

spasm


-MRI without evidence of aortic dissection. Will proceed with stress echo on 

Monday.


-May need f/u esophageal manometry to r/o esophageal dysmotility


-Orthostatic vitals negative for orthostatic hypotension


-Pulmonary and cardiology consulted.





Thyromegaly


-As per CT above


-Repeated TFTs. Normal TSH and low free T4


-Will need outpatient thyroid US.


-Will need f/u with endocrinologist





History of DVT


-Will require outpatient heme/onc for further workup





Seizure disorder, chronic


-c/w home dose carbamazepine 200mg qD





SLE, chronic


-c/w plaquenil 200mg qD, prednisone 7mg





Ppx


-DVT: SQH





FEN


-no standing fluids


-replete lytes as needed


-Low sodium diet. NPO after midnight on Sunday for Monday stress echo.





COVID negative 10/12





Dispo: continue to monitor on tele. 








Visit type





- Emergency Visit


Emergency Visit: Yes


ED Registration Date: 10/12/20


Care time: The patient presented to the Emergency Department on the above date 

and was hospitalized for further evaluation of their emergent condition.





- New Patient


This patient is new to me today: No





- Critical Care


Critical Care patient: No





ATTENDING PHYSICIAN STATEMENT





I saw and evaluated the patient.


I reviewed the resident's note and discussed the case with the resident.


I agree with the resident's findings and plan as documented.








SUBJECTIVE:








OBJECTIVE:








ASSESSMENT AND PLAN:

## 2020-10-18 RX ADMIN — SUCRALFATE SCH GM: 1 TABLET ORAL at 13:06

## 2020-10-18 RX ADMIN — HEPARIN SODIUM SCH UNIT: 5000 INJECTION, SOLUTION INTRAVENOUS; SUBCUTANEOUS at 06:23

## 2020-10-18 RX ADMIN — SUCRALFATE SCH: 1 TABLET ORAL at 13:40

## 2020-10-18 RX ADMIN — HEPARIN SODIUM SCH UNIT: 5000 INJECTION, SOLUTION INTRAVENOUS; SUBCUTANEOUS at 13:05

## 2020-10-18 RX ADMIN — ACETAMINOPHEN PRN MG: 325 TABLET ORAL at 14:24

## 2020-10-18 RX ADMIN — SUCRALFATE SCH: 1 TABLET ORAL at 21:24

## 2020-10-18 RX ADMIN — PANTOPRAZOLE SODIUM SCH MG: 40 TABLET, DELAYED RELEASE ORAL at 09:06

## 2020-10-18 RX ADMIN — HEPARIN SODIUM SCH UNIT: 5000 INJECTION, SOLUTION INTRAVENOUS; SUBCUTANEOUS at 21:23

## 2020-10-18 RX ADMIN — SUCRALFATE SCH: 1 TABLET ORAL at 17:41

## 2020-10-18 RX ADMIN — SUCRALFATE SCH GM: 1 TABLET ORAL at 09:03

## 2020-10-18 RX ADMIN — HYDROXYCHLOROQUINE SULFATE SCH MG: 200 TABLET, FILM COATED ORAL at 09:06

## 2020-10-18 NOTE — PN
Progress Note, Physician


History of Present Illness: 








Ms. Zuluaga is a 39 y/o black female with a PMH of SLE and RA (on Paquenil and 

Prednisone), HTN, seizures, DVT 10 years ago, morbid obesity (s/p gastric bypass

surgery 2019; weighed nearly 300 lbs at the time); anxiety; elevated 

triglycerides, ?hypothyroidism, now presenting to the ED because of 4 days of 

chest pain. The pain started abruptly; denies any inciting incident. It is 

constant, sharp, left sided, and occasionally radiates to her back. The pain is 

worse with deep breaths. She is also having increased SOB, worse with exertion 

and is also complaining of lightheadedness. She denies any LOC. She has 

experienced similar pain before (beginning about a year ago), but never this b

ad. She denies any associated N/V, abdominal pain, headache, blurry vision, or 

pain in her calves. Admits to DVT over 10 years ago, and she was anticoagulated 

with Coumadin. No longer on anticoagulants. Patient reports IV contrast allergy;

it makes her SOB. 





Pt says she walks regularly, including up and down stairs, but has been 

increasingly dyspneic for the past week.





She denies recent travel, sick contacts, hx of malignancy, cough, or hemoptysis.










- Current Medication List


Current Medications: 


Active Medications





Acetaminophen (Tylenol -)  650 mg PO Q6H PRN


   PRN Reason: PAIN LEVEL 6-10


   Last Admin: 10/18/20 14:24 Dose:  650 mg


   Documented by: 


Carbamazepine (Tegretol -)  200 mg PO DAILY [facility]


   Last Admin: 10/18/20 09:06 Dose:  200 mg


   Documented by: 


Heparin Sodium (Porcine) (Heparin -)  5,000 unit SQ TID [facility]


   Last Admin: 10/18/20 13:05 Dose:  5,000 unit


   Documented by: 


Hydroxychloroquine Sulfate (Plaquenil -)  200 mg PO DAILY [facility]


   Last Admin: 10/18/20 09:06 Dose:  200 mg


   Documented by: 


Pantoprazole Sodium (Protonix -)  40 mg PO DAILY [facility]


   Last Admin: 10/18/20 09:06 Dose:  40 mg


   Documented by: 


Prednisone 5 mg/ Prednisone 2 (mg)  7 mg PO DAILY [facility]


   Last Admin: 10/18/20 09:06 Dose:  7 mg


   Documented by: 


Sucralfate (Carafate -)  1 gm PO QID [facility]


   Last Admin: 10/18/20 13:40 Dose:  Not Given


   Documented by: 











- Objective


Vital Signs: 


                                   Vital Signs











Temperature  98 F   10/18/20 14:00


 


Pulse Rate  101 H  10/18/20 14:00


 


Respiratory Rate  20   10/18/20 14:00


 


Blood Pressure  148/86   10/18/20 14:00


 


O2 Sat by Pulse Oximetry (%)  98   10/18/20 10:00











Eyes: Yes: WNL, Conjunctiva Clear, EOM Intact


HENT: Yes: WNL, Atraumatic, Normocephalic


Neck: Yes: WNL, Supple, Trachea Midline


Cardiovascular: Yes: WNL, Regular Rate and Rhythm


Respiratory: Yes: WNL, Regular, CTA Bilaterally


Gastrointestinal: Yes: WNL, Normal Bowel Sounds


Genitourinary: Yes: WNL


Musculoskeletal: Yes: WNL


Extremities: Yes: WNL


Edema: No


Integumentary: Yes: WNL


Neurological: Yes: WNL, Alert, Oriented


...Motor Strength: WNL


Psychiatric: Yes: WNL


Labs: 


                                    CBC, BMP





                                 10/16/20 05:40 





                                 10/15/20 05:50 





                                    INR, PTT











INR  1.10  (0.83-1.09)  H  10/13/20  06:09    


 


Fibrinogen  284.0 mg/dL (238-498)   10/12/20  20:50    














Assessment/Plan








Atypical chest pain for the past year; elicited by deep breaths. 


SLE; RA


HTN


morbid obesity; s/p gastric bypass surgery


hypertriglyceridemia


seizures


?thyroid dysfunction


DVT 2010; not on anticoagulants after a few months of warfarin at that time


EKG NSR; septal infarct (noted since at least 2016)


ECHO: normal LVEF; abnormal diastolic compliance; no significant valvular 

disease.


TNI < 0.02 x 2; CK WNL.


CT chest: no aortic aneurysm; unable to assess for aortic dissection 

(noncontrast; pt allergic to contrast); diffuse thyroid enlargement





Rec:


Repeat EKG


Chest MRI negative for aortic dissection


f/u with PMD, rheumatologist (on Plaquenil, Prednisone)


f/u thyroid findings


Stress treadmill ECHO.

## 2020-10-18 NOTE — PN
Progress Note (short form)





- Note


Progress Note: 





Subjective:





No fever or chills. cont to have CP . light headed with standing . SOB + 





Objective:








Vital Signs:





                                Last Vital Signs











Temp Pulse Resp BP Pulse Ox


 


 97.8 F   89   20   117/59 L  98 


 


 10/18/20 06:20  10/18/20 06:20  10/18/20 06:20  10/18/20 06:20  10/17/20 21:28








Physical Exam:


NAD, awake, alert, flat affect 


CV: RRR


Lungs: CTAB 


Abd: sfot, NT, ND , NL BS 


Ext : No edema or erythema on upper or lower extremities.  








ASSESSMENT AND PLAN:








41 y/o lady with h/o SLE, DVt , Gastric bypass, seizure, asthma, fibromyalgia, 

and HTN who presented with cp . 





1- CP: unclear etiology at this time. unlikley cardiac. 


- Follow stress test tomorrow 


- if stress neg, need GI f/u as out pt for mannometry and EGD  . r/o esophageal 

spasm and PUD 


- start carafate and PPI as on chronic steroids 





2- Abnormal TFTs. repeat as out pt 





3- H/o DVT: 


- she needs to follow up with heme as out pt for further w/u  . 








Dispo : [facility]LOC  

















Visit type





- Emergency Visit


Emergency Visit: Yes


ED Registration Date: 10/12/20


Care time: The patient presented to the Emergency Department on the above date 

and was hospitalized for further evaluation of their emergent condition.





- New Patient


This patient is new to me today: No





- Critical Care


Critical Care patient: No

## 2020-10-19 VITALS — DIASTOLIC BLOOD PRESSURE: 63 MMHG | SYSTOLIC BLOOD PRESSURE: 113 MMHG | HEART RATE: 77 BPM | TEMPERATURE: 98.2 F

## 2020-10-19 RX ADMIN — HEPARIN SODIUM SCH UNIT: 5000 INJECTION, SOLUTION INTRAVENOUS; SUBCUTANEOUS at 15:06

## 2020-10-19 RX ADMIN — HEPARIN SODIUM SCH UNIT: 5000 INJECTION, SOLUTION INTRAVENOUS; SUBCUTANEOUS at 06:12

## 2020-10-19 RX ADMIN — HYDROXYCHLOROQUINE SULFATE SCH MG: 200 TABLET, FILM COATED ORAL at 09:49

## 2020-10-19 RX ADMIN — SUCRALFATE SCH: 1 TABLET ORAL at 15:06

## 2020-10-19 RX ADMIN — SUCRALFATE SCH: 1 TABLET ORAL at 09:48

## 2020-10-19 RX ADMIN — PANTOPRAZOLE SODIUM SCH: 40 TABLET, DELAYED RELEASE ORAL at 10:02

## 2020-10-19 NOTE — PN
Physical Exam: 


SUBJECTIVE: Patient seen and examined








OBJECTIVE:





                                   Vital Signs











 Period  Temp  Pulse  Resp  BP Sys/Sepulveda  Pulse Ox


 


 Last 24 Hr  97.7 F-98.8 F    18-20  108-142/64-87  











GENERAL: The patient is awake, alert, and fully oriented, in no acute distress.


HEAD: Normal with no signs of trauma.


EYES: PERRL, extraocular movements intact, sclera anicteric, conjunctiva clear. 

No ptosis. 


ENT: Ears normal, nares patent, oropharynx clear without exudates, moist mucous 


membranes.


NECK: Trachea midline, full range of motion, supple. 


LUNGS: Breath sounds equal, clear to auscultation bilaterally, no wheezes, no 

crackles, no 


accessory muscle use. 


HEART: Regular rate and rhythm, S1, S2 without murmur, rub or gallop.


ABDOMEN: Soft, nontender, nondistended, normoactive bowel sounds, no guarding, 

no 


rebound, no hepatosplenomegaly, no masses.


EXTREMITIES: 2+ pulses, warm, well-perfused, no edema. 


NEUROLOGICAL: Cranial nerves II through XII grossly intact. Normal speech, gait 

not 


observed.


PSYCH: Normal mood, normal affect.


SKIN: Warm, dry, normal turgor, no rashes or lesions noted














Active Medications











Generic Name Dose Route Start Last Admin





  Trade Name Freq  PRN Reason Stop Dose Admin


 


Acetaminophen  650 mg  10/14/20 16:05  10/18/20 14:24





  Tylenol -  PO   650 mg





  Q6H PRN   Administration





  PAIN LEVEL 6-10  


 


Carbamazepine  200 mg  10/16/20 10:00  10/19/20 10:02





  Tegretol -  PO   200 mg





  DAILY ADRIANNA   Administration


 


Heparin Sodium (Porcine)  5,000 unit  10/16/20 22:00  10/19/20 15:06





  Heparin -  SQ   5,000 unit





  TID ADRIANNA   Administration


 


Hydroxychloroquine Sulfate  200 mg  10/16/20 10:00  10/19/20 09:49





  Plaquenil -  PO   200 mg





  DAILY ADRIANNA   Administration


 


Pantoprazole Sodium  40 mg  10/18/20 10:00  10/19/20 10:02





  Protonix -  PO   Not Given





  DAILY ADRIANNA  


 


Prednisone 5 mg/ Prednisone 2  7 mg  10/16/20 10:00  10/19/20 09:49





  mg  PO   7 mg





  DAILY ADRIANNA   Administration


 


Sucralfate  1 gm  10/18/20 10:00  10/19/20 15:06





  Carafate -  PO   Not Given





  QID ADRIANNA  











ASSESSMENT/PLAN:








ATTENDING PHYSICIAN STATEMENT





I saw and evaluated the patient.


I reviewed the resident's note and discussed the case with the resident.


I agree with the resident's findings and plan as documented.








SUBJECTIVE:








OBJECTIVE:








ASSESSMENT AND PLAN:

## 2020-10-19 NOTE — ECHO
______________________________________________________________________________



Name: GIOVANA RANGEL

Exam:Exerise Stress Echocardiogram

MRN: [id number]

Study Date: 10/19/2020 01:35 PM

Age: 40 yrs

______________________________________________________________________________



Reason For Study: SOB

Height: 64 in        Weight: 220 lb        BSA: 2.0 m2



______________________________________________________________________________



______________________________________________________________________________

______________________________________________________________________________









I      WMSI = 1.00     % Normal = 100



































IV      WMSI = 1.00     % Normal = 100

































                                                                       Segments  Size

X - Cannot                  2 -                                        1-2     small

Interpret     1 - Normal    Hypokinetic    3 - Akinetic  4 - Dyskinetic3-5     moderate

5 - Aneurysmal                                                         6-14    large

                                                                       15-16   diffuse





Procedure Details:

Exercise Stress Echocardiogram with 2D imaging.



Stress Comments

Resting blood pressure was within normal limits.

Normal resting electrocardiogram.

A treadmill exercise test according to Bowen protocol was performed.

Maximum Heart Rate achieved was 90-95% of maximum age-predicted heart rate.

There was no new ST segment depression.

Total Stress Time was 10-11 minutes.



Left Ventricle

The left ventricle is normal in size.

There is normal left ventricular wall thickness.

The left ventricle is normal in structure and function.

The left ventricular ejection fraction is normal.



Exercise Echocardiogram

Negative exercise stress echocardiogram, adequate by heart rate criteria, without symptoms, diagnosti
c EKG

changes or echocardiographic evidence of ischemia.





______________________________________________________________________________





Interpretation Summary

Negative exercise stress echocardiogram, adequate by heart rate criteria, without symptoms, diagnosti
c EKG

changes or echocardiographic evidence of ischemia. Patient exercised 10 min 14 sec into stage 4 Bowen
 protocol

and achieved 93% of age predicted maximal target HR.



1. Normal augmentation of all myocardial segments. There is no clinical, electrocardiographic or

echocardiographic evidence of exercise induced myocardial ischemia

2. Patient remained asymptomatic









Reading Physician:

                  Dale Croft MD 10/19/2020 03:42 PM

## 2020-10-19 NOTE — PN
Teaching Attending Note


Name of Resident: Sana Pink





ATTENDING PHYSICIAN STATEMENT





I saw and evaluated the patient.


I reviewed the resident's note and discussed the case with the resident.


I agree with the resident's findings and plan as documented.








SUBJECTIVE:


no fever or chills. contt o have cp which is not changed since being in hospital







OBJECTIVE:





NAD, awake, alert, flat affect 


CV: RRR


Lungs: CTAB 


Ext : No edema or erythema on upper or lower extremities.  








ASSESSMENT AND PLAN:








41 y/o lady with h/o SLE, DVt , Gastric bypass, seizure, asthma, fibromyalgia, 

and HTN who presented with cp . 





1- CP: unclear etiology at this time. . 


-  stress test with n o signs of ischemia 


- need GI f/u as out pt for mannometry and EGD  . r/o esophageal spasm and PUD 


- she declined carafate and PPI in  house. will send her on a trial of PPI  





2- Abnormal TFTs. repeat as out pt , need US for enlarged thyroid on CT 





3- H/o DVT: 


- she needs to follow up with heme as out pt for further w/u  . 








DC home today

## 2020-10-19 NOTE — DS
Physical Exam: 


SUBJECTIVE: Patient seen and examined. No acute events noted. 








OBJECTIVE:





                                   Vital Signs











 Period  Temp  Pulse  Resp  BP Sys/Sepulveda  Pulse Ox


 


 Last 24 Hr  97.7 F-98.8 F    18-20  108-136/63-71  








PHYSICAL EXAM





GENERAL: The patient is awake, alert, and fully oriented, in no acute distress.


LUNGS: Breath sounds equal, clear to auscultation bilaterally.


HEART: Regular rate and rhythm, S1, S2 without murmur, rub or gallop.


ABDOMEN: Soft, nontender, nondistended. 


EXTREMITIES: 2+ pulses, warm, well-perfused, no edema. 


NEUROLOGICAL: Cranial nerves II through XII grossly intact. Normal speech, gait 

not observed.


PSYCH: Normal mood, normal affect.





HOSPITAL COURSE:





Date of Admission:10/12/20





39 y/o lady with h/o SLE, DVt , Gastric bypass, seizure, asthma, fibromyalgia, 

and HTN who was admitted for having atypical cp. The suspicion for PE or dissec

tion was moderately high but unfortunately we were unable to do CTA given iodine

allergy subsequently pt underwent dry CT chest and stress test. On CT chest, it 

showed a 3.3 cm dilated pumonary artery suggesting pulm htn and aortic 

dissection could not be ruled out without contrast so it was recommended to 

undergo MRI/MRA which was ordered given pt still having cp but it was negative. 

Thus, cp was of unclear etiology. Pt was given GI f/u as out pt for manometry 

and EGD to r/o esophageal spasm and PUD. She declined carafate and PPI in house.

We will send her on a trial of PPI. Furthermore, she had abnormal TFTs so 

recommending she repeat as out pt and do an US for enlarged thyroid shown on CT 

chest here.  For her h/o DVT she will need to follow up with heme as out pt for 

further w/u. Pt 








Date of Discharge: 10/19/20











Minutes to complete discharge: 35





Discharge Summary


Problems reviewed: Yes


Reason For Visit: SHORTNESS OF BREATH; CHEST PAIN


Current Active Problems





Chest pain (Acute)


HTN (hypertension) (Chronic)


SLE (systemic lupus erythematosus) (Chronic)


Shortness of breath (Chronic)








Condition: Stable





- Instructions


Diet, Activity, Other Instructions: 


Your visit:


You came to the hospital due to shortness of breath and pain when you take a 

deep breath. You were admitted to the hospital to evaluate your symptoms. You 

had imaging of your chest, heart, and legs. You did a test called a, "V/Q scan" 

to assess for any evidence of clots in your chest. Your V/Q scan results were 

normal. . Your heart showed some evidence of dysfunction in its ability to 

relax. There were no concerning findings of your legs. Laboratory testing showed

no evidence of damage to your heart nor signs of inflammation. While here, you 

were seen by a heart specialist and a lung specialist. You had a stress test of 

your heart which was found to be normal indicating the chest pain is not related

to your heart. You are stable and may return home. 





Additional imaging findings:


During imaging of your chest, there was an incidental finding of an enlarged 

thyroid. Please follow up with your primary care provider for further care of 

this finding. ( you need ulltra sound ) and you need repeat thyroid function 

levels 





Medication:


Please continue to take your home medications as prescribed:


   -Amitriptyline HCl (Elavil) 150mg by mouth at bedtime for nerve pain.


   -Carbamazepine 200mg by mouth daily and Topiramate (Topamax) 50mg by mouth 

twice a day for your seizure disorder.


   -Hydroxychloroquine Sulfate (Plaquenil) 200mg by mouth and Prednisone 7mg by 

mouth daily for your Lupus.





- You may take acetaminophen 650mg every 6 hours as needed for your discomfort.





Please START taking protonix 20 mg by mouth once daily for prophylaxis of ulcers

while on the steroids. 





Follow up:


-Please make and appointment with your primary care provider, Dr. Reyes, in 2 

weeks. You may discuss your hospitalization. You will need to follow up with a 

thyroid ultrasound with your doctor as well.


-Please make and appointment with your rheumatologist in 1 week. If you do not 

have one, a referral has been provided to see Dr. Dove for management and 

medication optimization for your Lupus.


-Please make an appointment with your cardiologist, Dr. Scott, in 1 week. 

You may discuss findings of your heart during this visit. 


-Please make an appointment with your pulmonologist, Dr. Garza, in 1 week. 

You can schedule for pulmonary function test and sleep study.


- please make an appointment with Dr. Lepe form endocrinology, you need 

repeat of your thyroid tests to address the abnormal levels seen in this visit 


-Please make an appointment to see a hematologist, Dr. Roach. You may 

need further investigation regarding your history of forming clots in your legs 

in setting of  lupus 


- please see dr. Nneka baker GI at your convenience you might need endoscopy and

manometry for your esophagus 


Additional information:


-You are being discharged to your home.


-Please return to the Emergency Department if you experience worsening pain, 

fevers, chills, shortness of breath, or chest pain, or if you experience any 

worsening, new or concerning symptoms. 


Referrals: 


Pan Dove MD [Staff Physician] - 1 Week


Kathrine Early DO [Staff Physician] - 


Anupam Scott MD [Staff Physician] - 1 Week


Vanessa Roach MD [Staff Physician] - 1 Week


Cory Lepe MD [Staff Physician] - 1 Week


Wan Reyes [Primary Care Provider] - 2 Weeks


Jin Garza MD [Staff Physician] - 1 Week


Disposition: HOME





- Home Medications


Comprehensive Discharge Medication List: 


Ambulatory Orders





Acetaminophen [Tylenol .Regular Strength -] 650 mg PO Q6H PRN #60 tablet 

10/15/20 


Amitriptyline HCl [Elavil -] 150 mg PO HS 10/15/20 


Carbamazepine [Carbamazepine ER] 200 mg PO BID 10/15/20 


Hydroxychloroquine Sulfate [Plaquenil] 200 mg PO DAILY 10/15/20 


Prednisone 5 mg PO BID 10/15/20 


Topiramate [Topamax] 50 mg PO BID 10/15/20 


Pantoprazole Sodium [Protonix] 20 mg PO DAILY #30 tablet. 10/19/20 


Tizanidine HCl 4 mg PO TID 10/19/20 








This patient is new to me today: Yes


Date on this admission: 10/20/20


Emergency Visit: Yes


ED Registration Date: 10/12/20


Care time: The patient presented to the Emergency Department on the above date 

and was hospitalized for further evaluation of their emergent condition.


Critical Care patient: No





- Discharge Referral


Referred to [facility] No





ATTENDING PHYSICIAN STATEMENT





I saw and evaluated the patient.


I reviewed the resident's note and discussed the case with the resident.


I agree with the resident's findings and plan as documented.








SUBJECTIVE:








OBJECTIVE:








ASSESSMENT AND PLAN:

## 2020-10-19 NOTE — PN
Progress Note, Physician


History of Present Illness: 








Ms. Zuluaga is a 41 y/o black female with a PMH of SLE and RA (on Paquenil and 

Prednisone), HTN, seizures, DVT 10 years ago, morbid obesity (s/p gastric bypass

surgery 2019; weighed nearly 300 lbs at the time); anxiety; elevated 

triglycerides, ?hypothyroidism, now presenting to the ED because of 4 days of 

chest pain. The pain started abruptly; denies any inciting incident. It is 

constant, sharp, left sided, and occasionally radiates to her back. The pain is 

worse with deep breaths. She is also having increased SOB, worse with exertion 

and is also complaining of lightheadedness. She denies any LOC. She has 

experienced similar pain before (beginning about a year ago), but never this b

ad. She denies any associated N/V, abdominal pain, headache, blurry vision, or 

pain in her calves. Admits to DVT over 10 years ago, and she was anticoagulated 

with Coumadin. No longer on anticoagulants. Patient reports IV contrast allergy;

it makes her SOB. 





Pt says she walks regularly, including up and down stairs, but has been 

increasingly dyspneic for the past week.





She denies recent travel, sick contacts, hx of malignancy, cough, or hemoptysis.










- Current Medication List


Current Medications: 


Active Medications





Acetaminophen (Tylenol -)  650 mg PO Q6H PRN


   PRN Reason: PAIN LEVEL 6-10


   Last Admin: 10/18/20 14:24 Dose:  650 mg


   Documented by: 


Carbamazepine (Tegretol -)  200 mg PO DAILY [facility]


   Last Admin: 10/19/20 10:02 Dose:  200 mg


   Documented by: 


Heparin Sodium (Porcine) (Heparin -)  5,000 unit SQ TID [facility]


   Last Admin: 10/19/20 15:06 Dose:  5,000 unit


   Documented by: 


Hydroxychloroquine Sulfate (Plaquenil -)  200 mg PO DAILY [facility]


   Last Admin: 10/19/20 09:49 Dose:  200 mg


   Documented by: 


Pantoprazole Sodium (Protonix -)  40 mg PO DAILY [facility]


   Last Admin: 10/19/20 10:02 Dose:  Not Given


   Documented by: 


Prednisone 5 mg/ Prednisone 2 (mg)  7 mg PO DAILY [facility]


   Last Admin: 10/19/20 09:49 Dose:  7 mg


   Documented by: 


Sucralfate (Carafate -)  1 gm PO QID [facility]


   Last Admin: 10/19/20 15:06 Dose:  Not Given


   Documented by: 











- Objective


Vital Signs: 


                                   Vital Signs











Temperature  97.7 F   10/19/20 09:45


 


Pulse Rate  96 H  10/19/20 09:45


 


Respiratory Rate  18   10/19/20 09:45


 


Blood Pressure  108/64   10/19/20 09:45


 


O2 Sat by Pulse Oximetry (%)  100   10/19/20 09:45











Eyes: Yes: WNL, Conjunctiva Clear, EOM Intact


HENT: Yes: WNL, Atraumatic, Normocephalic


Neck: Yes: WNL, Supple, Trachea Midline


Cardiovascular: Yes: WNL, Regular Rate and Rhythm


Respiratory: Yes: WNL, Regular, CTA Bilaterally


Gastrointestinal: Yes: WNL, Normal Bowel Sounds


Genitourinary: Yes: WNL


Musculoskeletal: Yes: WNL


Extremities: Yes: WNL


Edema: No


Integumentary: Yes: WNL


Neurological: Yes: WNL, Alert, Oriented


...Motor Strength: WNL


Psychiatric: Yes: WNL


Labs: 


                                    CBC, BMP





                                 10/16/20 05:40 





                                 10/15/20 05:50 





                                    INR, PTT











INR  1.10  (0.83-1.09)  H  10/13/20  06:09    


 


Fibrinogen  284.0 mg/dL (238-498)   10/12/20  20:50    














Assessment/Plan








Atypical chest pain for the past year; elicited by deep breaths. 


SLE; RA


HTN


morbid obesity; s/p gastric bypass surgery


hypertriglyceridemia


seizures


?thyroid dysfunction


DVT 2010; not on anticoagulants after a few months of warfarin at that time


EKG NSR; septal infarct (noted since at least 2016)


ECHO: normal LVEF; abnormal diastolic compliance; no significant valvular 

disease.


TNI < 0.02 x 2; CK WNL.


CT chest: no aortic aneurysm; unable to assess for aortic dissection 

(noncontrast; pt allergic to contrast); diffuse thyroid enlargement





Rec:


Chest MRI negative for aortic dissection


f/u with PMD, rheumatologist (on Plaquenil, Prednisone)


f/u thyroid findings


Awaiting Stress treadmill ECHO results.


If negativce patient may be discharged home.

## 2021-04-25 ENCOUNTER — HOSPITAL ENCOUNTER (EMERGENCY)
Dept: HOSPITAL 74 - JERFT | Age: 41
Discharge: HOME | End: 2021-04-25
Payer: COMMERCIAL

## 2021-04-25 VITALS — BODY MASS INDEX: 37.8 KG/M2

## 2021-04-25 VITALS — HEART RATE: 88 BPM | DIASTOLIC BLOOD PRESSURE: 78 MMHG | TEMPERATURE: 98.4 F | SYSTOLIC BLOOD PRESSURE: 110 MMHG

## 2021-04-25 DIAGNOSIS — S05.01XA: Primary | ICD-10-CM

## 2021-09-09 ENCOUNTER — HOSPITAL ENCOUNTER (EMERGENCY)
Dept: HOSPITAL 74 - JER | Age: 41
Discharge: HOME | End: 2021-09-09
Payer: COMMERCIAL

## 2021-09-09 VITALS — DIASTOLIC BLOOD PRESSURE: 83 MMHG | HEART RATE: 86 BPM | SYSTOLIC BLOOD PRESSURE: 128 MMHG | TEMPERATURE: 98.2 F

## 2021-09-09 VITALS — BODY MASS INDEX: 39.4 KG/M2

## 2021-09-09 DIAGNOSIS — M32.9: Primary | ICD-10-CM

## 2022-04-16 ENCOUNTER — HOSPITAL ENCOUNTER (INPATIENT)
Dept: HOSPITAL 74 - JER | Age: 42
LOS: 4 days | Discharge: HOME | DRG: 346 | End: 2022-04-20
Attending: INTERNAL MEDICINE | Admitting: INTERNAL MEDICINE
Payer: COMMERCIAL

## 2022-04-16 VITALS — BODY MASS INDEX: 39.4 KG/M2

## 2022-04-16 DIAGNOSIS — Z86.718: ICD-10-CM

## 2022-04-16 DIAGNOSIS — E66.9: ICD-10-CM

## 2022-04-16 DIAGNOSIS — M79.7: ICD-10-CM

## 2022-04-16 DIAGNOSIS — M32.9: Primary | ICD-10-CM

## 2022-04-16 DIAGNOSIS — G40.909: ICD-10-CM

## 2022-04-16 DIAGNOSIS — I10: ICD-10-CM

## 2022-04-16 LAB
ALBUMIN SERPL-MCNC: 4 G/DL (ref 3.4–5)
ALP SERPL-CCNC: 70 U/L (ref 45–117)
ALT SERPL-CCNC: 32 U/L (ref 13–61)
ANION GAP SERPL CALC-SCNC: 8 MMOL/L (ref 8–16)
APPEARANCE UR: CLEAR
AST SERPL-CCNC: 20 U/L (ref 15–37)
BASOPHILS # BLD: 0.4 % (ref 0–2)
BILIRUB SERPL-MCNC: 0.2 MG/DL (ref 0.2–1)
BILIRUB UR STRIP.AUTO-MCNC: NEGATIVE MG/DL
BUN SERPL-MCNC: 10.7 MG/DL (ref 7–18)
CALCIUM SERPL-MCNC: 9.3 MG/DL (ref 8.5–10.1)
CHLORIDE SERPL-SCNC: 105 MMOL/L (ref 98–107)
CO2 SERPL-SCNC: 26 MMOL/L (ref 21–32)
COLOR UR: YELLOW
CREAT SERPL-MCNC: 0.7 MG/DL (ref 0.55–1.3)
DEPRECATED RDW RBC AUTO: 13 % (ref 11.6–15.6)
EOSINOPHIL # BLD: 1.8 % (ref 0–4.5)
GLUCOSE SERPL-MCNC: 117 MG/DL (ref 74–106)
HCT VFR BLD CALC: 38.4 % (ref 32.4–45.2)
HGB BLD-MCNC: 13.4 GM/DL (ref 10.7–15.3)
KETONES UR QL STRIP: NEGATIVE
LEUKOCYTE ESTERASE UR QL STRIP.AUTO: NEGATIVE
LYMPHOCYTES # BLD: 43 % (ref 8–40)
MAGNESIUM SERPL-MCNC: 2.2 MG/DL (ref 1.8–2.4)
MCH RBC QN AUTO: 32 PG (ref 25.7–33.7)
MCHC RBC AUTO-ENTMCNC: 34.8 G/DL (ref 32–36)
MCV RBC: 91.9 FL (ref 80–96)
MONOCYTES # BLD AUTO: 4.6 % (ref 3.8–10.2)
NEUTROPHILS # BLD: 50.2 % (ref 42.8–82.8)
NITRITE UR QL STRIP: NEGATIVE
PH UR: 6.5 [PH] (ref 5–8)
PLATELET # BLD AUTO: 304 10^3/UL (ref 134–434)
PMV BLD: 7.9 FL (ref 7.5–11.1)
PROT SERPL-MCNC: 7.5 G/DL (ref 6.4–8.2)
PROT UR QL STRIP: NEGATIVE
PROT UR QL STRIP: NEGATIVE
RBC # BLD AUTO: 4.18 M/MM3 (ref 3.6–5.2)
SODIUM SERPL-SCNC: 140 MMOL/L (ref 136–145)
SP GR UR: 1.01 (ref 1.01–1.03)
UROBILINOGEN UR STRIP-MCNC: 0.2 MG/DL (ref 0.2–1)
WBC # BLD AUTO: 7.6 K/MM3 (ref 4–10)

## 2022-04-16 PROCEDURE — U0005 INFEC AGEN DETEC AMPLI PROBE: HCPCS

## 2022-04-16 PROCEDURE — U0003 INFECTIOUS AGENT DETECTION BY NUCLEIC ACID (DNA OR RNA); SEVERE ACUTE RESPIRATORY SYNDROME CORONAVIRUS 2 (SARS-COV-2) (CORONAVIRUS DISEASE [COVID-19]), AMPLIFIED PROBE TECHNIQUE, MAKING USE OF HIGH THROUGHPUT TECHNOLOGIES AS DESCRIBED BY CMS-2020-01-R: HCPCS

## 2022-04-16 RX ADMIN — TOPIRAMATE SCH MG: 25 TABLET, FILM COATED ORAL at 21:39

## 2022-04-16 RX ADMIN — CARBAMAZEPINE SCH MG: 200 TABLET, EXTENDED RELEASE ORAL at 21:40

## 2022-04-16 RX ADMIN — AMITRIPTYLINE HYDROCHLORIDE SCH MG: 75 TABLET, FILM COATED ORAL at 21:43

## 2022-04-16 RX ADMIN — TIZANIDINE PRN MG: 4 TABLET ORAL at 21:40

## 2022-04-17 LAB
ANION GAP SERPL CALC-SCNC: 4 MMOL/L (ref 8–16)
BUN SERPL-MCNC: 9.1 MG/DL (ref 7–18)
CALCIUM SERPL-MCNC: 8.7 MG/DL (ref 8.5–10.1)
CHLORIDE SERPL-SCNC: 108 MMOL/L (ref 98–107)
CO2 SERPL-SCNC: 27 MMOL/L (ref 21–32)
CREAT SERPL-MCNC: 0.8 MG/DL (ref 0.55–1.3)
DEPRECATED RDW RBC AUTO: 13 % (ref 11.6–15.6)
GLUCOSE SERPL-MCNC: 200 MG/DL (ref 74–106)
HCT VFR BLD CALC: 36 % (ref 32.4–45.2)
HGB BLD-MCNC: 12.4 GM/DL (ref 10.7–15.3)
MCH RBC QN AUTO: 31.5 PG (ref 25.7–33.7)
MCHC RBC AUTO-ENTMCNC: 34.5 G/DL (ref 32–36)
MCV RBC: 91.3 FL (ref 80–96)
PLATELET # BLD AUTO: 295 10^3/UL (ref 134–434)
PMV BLD: 8 FL (ref 7.5–11.1)
RBC # BLD AUTO: 3.95 M/MM3 (ref 3.6–5.2)
SODIUM SERPL-SCNC: 139 MMOL/L (ref 136–145)
WBC # BLD AUTO: 5.5 K/MM3 (ref 4–10)

## 2022-04-17 RX ADMIN — HYDROXYCHLOROQUINE SULFATE SCH MG: 200 TABLET, FILM COATED ORAL at 10:32

## 2022-04-17 RX ADMIN — ENOXAPARIN SODIUM SCH MG: 40 INJECTION SUBCUTANEOUS at 09:22

## 2022-04-17 RX ADMIN — TIZANIDINE PRN MG: 4 TABLET ORAL at 05:30

## 2022-04-17 RX ADMIN — AMITRIPTYLINE HYDROCHLORIDE SCH MG: 75 TABLET, FILM COATED ORAL at 21:06

## 2022-04-17 RX ADMIN — CARBAMAZEPINE SCH MG: 200 TABLET, EXTENDED RELEASE ORAL at 09:24

## 2022-04-17 RX ADMIN — TOPIRAMATE SCH MG: 25 TABLET, FILM COATED ORAL at 23:09

## 2022-04-17 RX ADMIN — CARBAMAZEPINE SCH MG: 200 TABLET, EXTENDED RELEASE ORAL at 21:06

## 2022-04-17 RX ADMIN — TOPIRAMATE SCH MG: 25 TABLET, FILM COATED ORAL at 09:23

## 2022-04-18 LAB
ANION GAP SERPL CALC-SCNC: 10 MMOL/L (ref 8–16)
BASOPHILS # BLD: 0.5 % (ref 0–2)
BUN SERPL-MCNC: 9.4 MG/DL (ref 7–18)
CALCIUM SERPL-MCNC: 9.5 MG/DL (ref 8.5–10.1)
CHLORIDE SERPL-SCNC: 108 MMOL/L (ref 98–107)
CO2 SERPL-SCNC: 24 MMOL/L (ref 21–32)
CREAT SERPL-MCNC: 0.8 MG/DL (ref 0.55–1.3)
DEPRECATED RDW RBC AUTO: 13 % (ref 11.6–15.6)
EOSINOPHIL # BLD: 1 % (ref 0–4.5)
GLUCOSE SERPL-MCNC: 148 MG/DL (ref 74–106)
HCT VFR BLD CALC: 40.1 % (ref 32.4–45.2)
HGB BLD-MCNC: 13.9 GM/DL (ref 10.7–15.3)
LYMPHOCYTES # BLD: 35.2 % (ref 8–40)
MCH RBC QN AUTO: 31.5 PG (ref 25.7–33.7)
MCHC RBC AUTO-ENTMCNC: 34.6 G/DL (ref 32–36)
MCV RBC: 91 FL (ref 80–96)
MONOCYTES # BLD AUTO: 6.5 % (ref 3.8–10.2)
NEUTROPHILS # BLD: 56.8 % (ref 42.8–82.8)
PLATELET # BLD AUTO: 349 10^3/UL (ref 134–434)
PMV BLD: 7.8 FL (ref 7.5–11.1)
RBC # BLD AUTO: 4.41 M/MM3 (ref 3.6–5.2)
SODIUM SERPL-SCNC: 141 MMOL/L (ref 136–145)
WBC # BLD AUTO: 8.2 K/MM3 (ref 4–10)

## 2022-04-18 RX ADMIN — HYDROXYCHLOROQUINE SULFATE SCH: 200 TABLET, FILM COATED ORAL at 11:10

## 2022-04-18 RX ADMIN — PREDNISONE SCH MG: 20 TABLET ORAL at 10:17

## 2022-04-18 RX ADMIN — PANTOPRAZOLE SODIUM SCH MG: 40 INJECTION, POWDER, FOR SOLUTION INTRAVENOUS at 10:19

## 2022-04-18 RX ADMIN — HYDROXYCHLOROQUINE SULFATE SCH MG: 200 TABLET, FILM COATED ORAL at 15:20

## 2022-04-18 RX ADMIN — TOPIRAMATE SCH MG: 25 TABLET, FILM COATED ORAL at 21:11

## 2022-04-18 RX ADMIN — CARBAMAZEPINE SCH MG: 200 TABLET, EXTENDED RELEASE ORAL at 21:11

## 2022-04-18 RX ADMIN — AMLODIPINE BESYLATE SCH MG: 5 TABLET ORAL at 10:18

## 2022-04-18 RX ADMIN — ENOXAPARIN SODIUM SCH MG: 40 INJECTION SUBCUTANEOUS at 10:20

## 2022-04-18 RX ADMIN — POLYETHYLENE GLYCOL 3350 SCH GM: 17 POWDER, FOR SOLUTION ORAL at 11:08

## 2022-04-18 RX ADMIN — AMITRIPTYLINE HYDROCHLORIDE SCH MG: 75 TABLET, FILM COATED ORAL at 21:13

## 2022-04-18 RX ADMIN — CARBAMAZEPINE SCH MG: 200 TABLET, EXTENDED RELEASE ORAL at 11:09

## 2022-04-18 RX ADMIN — FOLIC ACID SCH MG: 1 TABLET ORAL at 10:18

## 2022-04-18 RX ADMIN — TOPIRAMATE SCH MG: 25 TABLET, FILM COATED ORAL at 11:09

## 2022-04-19 LAB
ANION GAP SERPL CALC-SCNC: 8 MMOL/L (ref 8–16)
BASOPHILS # BLD: 0.3 % (ref 0–2)
BUN SERPL-MCNC: 12.7 MG/DL (ref 7–18)
CALCIUM SERPL-MCNC: 9.2 MG/DL (ref 8.5–10.1)
CHLORIDE SERPL-SCNC: 110 MMOL/L (ref 98–107)
CO2 SERPL-SCNC: 25 MMOL/L (ref 21–32)
CREAT SERPL-MCNC: 0.6 MG/DL (ref 0.55–1.3)
DEPRECATED RDW RBC AUTO: 13.3 % (ref 11.6–15.6)
EOSINOPHIL # BLD: 1.1 % (ref 0–4.5)
GLUCOSE SERPL-MCNC: 98 MG/DL (ref 74–106)
HCT VFR BLD CALC: 37 % (ref 32.4–45.2)
HGB BLD-MCNC: 12.8 GM/DL (ref 10.7–15.3)
LYMPHOCYTES # BLD: 41.9 % (ref 8–40)
MCH RBC QN AUTO: 31.5 PG (ref 25.7–33.7)
MCHC RBC AUTO-ENTMCNC: 34.7 G/DL (ref 32–36)
MCV RBC: 90.9 FL (ref 80–96)
MONOCYTES # BLD AUTO: 6.8 % (ref 3.8–10.2)
NEUTROPHILS # BLD: 49.9 % (ref 42.8–82.8)
PLATELET # BLD AUTO: 326 10^3/UL (ref 134–434)
PMV BLD: 7.9 FL (ref 7.5–11.1)
RBC # BLD AUTO: 4.07 M/MM3 (ref 3.6–5.2)
SODIUM SERPL-SCNC: 143 MMOL/L (ref 136–145)
WBC # BLD AUTO: 7.4 K/MM3 (ref 4–10)

## 2022-04-19 RX ADMIN — POLYETHYLENE GLYCOL 3350 SCH GM: 17 POWDER, FOR SOLUTION ORAL at 09:15

## 2022-04-19 RX ADMIN — FOLIC ACID SCH MG: 1 TABLET ORAL at 09:15

## 2022-04-19 RX ADMIN — AMLODIPINE BESYLATE SCH MG: 5 TABLET ORAL at 09:16

## 2022-04-19 RX ADMIN — TOPIRAMATE SCH MG: 25 TABLET, FILM COATED ORAL at 09:17

## 2022-04-19 RX ADMIN — ENOXAPARIN SODIUM SCH MG: 40 INJECTION SUBCUTANEOUS at 09:40

## 2022-04-19 RX ADMIN — CARBAMAZEPINE SCH MG: 200 TABLET, EXTENDED RELEASE ORAL at 09:17

## 2022-04-19 RX ADMIN — TOPIRAMATE SCH MG: 25 TABLET, FILM COATED ORAL at 22:12

## 2022-04-19 RX ADMIN — HYDROXYCHLOROQUINE SULFATE SCH MG: 200 TABLET, FILM COATED ORAL at 09:16

## 2022-04-19 RX ADMIN — AMITRIPTYLINE HYDROCHLORIDE SCH MG: 75 TABLET, FILM COATED ORAL at 22:12

## 2022-04-19 RX ADMIN — PANTOPRAZOLE SODIUM SCH MG: 40 INJECTION, POWDER, FOR SOLUTION INTRAVENOUS at 09:44

## 2022-04-19 RX ADMIN — CARBAMAZEPINE SCH MG: 200 TABLET, EXTENDED RELEASE ORAL at 22:12

## 2022-04-19 RX ADMIN — PREDNISONE SCH MG: 20 TABLET ORAL at 09:10

## 2022-04-20 VITALS — SYSTOLIC BLOOD PRESSURE: 141 MMHG | DIASTOLIC BLOOD PRESSURE: 85 MMHG | HEART RATE: 118 BPM | TEMPERATURE: 98.1 F

## 2022-04-20 LAB
ANION GAP SERPL CALC-SCNC: 8 MMOL/L (ref 8–16)
BASOPHILS # BLD: 0.5 % (ref 0–2)
BUN SERPL-MCNC: 12.8 MG/DL (ref 7–18)
CALCIUM SERPL-MCNC: 8.7 MG/DL (ref 8.5–10.1)
CHLORIDE SERPL-SCNC: 109 MMOL/L (ref 98–107)
CO2 SERPL-SCNC: 24 MMOL/L (ref 21–32)
CREAT SERPL-MCNC: 0.7 MG/DL (ref 0.55–1.3)
DEPRECATED RDW RBC AUTO: 13.3 % (ref 11.6–15.6)
EOSINOPHIL # BLD: 1.3 % (ref 0–4.5)
GLUCOSE SERPL-MCNC: 106 MG/DL (ref 74–106)
HCT VFR BLD CALC: 36.3 % (ref 32.4–45.2)
HGB BLD-MCNC: 12.8 GM/DL (ref 10.7–15.3)
LYMPHOCYTES # BLD: 45.9 % (ref 8–40)
MCH RBC QN AUTO: 32 PG (ref 25.7–33.7)
MCHC RBC AUTO-ENTMCNC: 35.3 G/DL (ref 32–36)
MCV RBC: 90.7 FL (ref 80–96)
MONOCYTES # BLD AUTO: 6.8 % (ref 3.8–10.2)
NEUTROPHILS # BLD: 45.5 % (ref 42.8–82.8)
PLATELET # BLD AUTO: 295 10^3/UL (ref 134–434)
PMV BLD: 7.2 FL (ref 7.5–11.1)
RBC # BLD AUTO: 4 M/MM3 (ref 3.6–5.2)
SODIUM SERPL-SCNC: 141 MMOL/L (ref 136–145)
WBC # BLD AUTO: 7.8 K/MM3 (ref 4–10)

## 2022-04-20 RX ADMIN — ENOXAPARIN SODIUM SCH MG: 40 INJECTION SUBCUTANEOUS at 09:43

## 2022-04-20 RX ADMIN — PANTOPRAZOLE SODIUM SCH MG: 40 INJECTION, POWDER, FOR SOLUTION INTRAVENOUS at 09:41

## 2022-04-20 RX ADMIN — CARBAMAZEPINE SCH MG: 200 TABLET, EXTENDED RELEASE ORAL at 09:44

## 2022-04-20 RX ADMIN — POLYETHYLENE GLYCOL 3350 SCH: 17 POWDER, FOR SOLUTION ORAL at 09:43

## 2022-04-20 RX ADMIN — TOPIRAMATE SCH MG: 25 TABLET, FILM COATED ORAL at 09:42

## 2022-04-20 RX ADMIN — AMLODIPINE BESYLATE SCH MG: 5 TABLET ORAL at 09:42

## 2022-04-20 RX ADMIN — HYDROXYCHLOROQUINE SULFATE SCH MG: 200 TABLET, FILM COATED ORAL at 09:45

## 2022-04-20 RX ADMIN — FOLIC ACID SCH MG: 1 TABLET ORAL at 09:42

## 2022-04-20 RX ADMIN — PREDNISONE SCH MG: 20 TABLET ORAL at 09:43
